# Patient Record
Sex: MALE | Race: BLACK OR AFRICAN AMERICAN | NOT HISPANIC OR LATINO | Employment: FULL TIME | URBAN - METROPOLITAN AREA
[De-identification: names, ages, dates, MRNs, and addresses within clinical notes are randomized per-mention and may not be internally consistent; named-entity substitution may affect disease eponyms.]

---

## 2017-11-27 ENCOUNTER — CLINICAL SUPPORT (OUTPATIENT)
Dept: INTERNAL MEDICINE | Facility: CLINIC | Age: 62
End: 2017-11-27

## 2017-11-27 DIAGNOSIS — Z00.00 PHYSICAL EXAM: Primary | ICD-10-CM

## 2017-11-27 LAB
BILIRUB SERPL-MCNC: NEGATIVE MG/DL
BLOOD URINE, POC: ABNORMAL
COLOR, POC UA: YELLOW
GLUCOSE UR QL STRIP: NEGATIVE
KETONES UR QL STRIP: NEGATIVE
LEUKOCYTE ESTERASE URINE, POC: NEGATIVE
NITRITE, POC UA: NEGATIVE
PH, POC UA: 5
PROTEIN, POC: NEGATIVE
SPECIFIC GRAVITY, POC UA: 1.03
UROBILINOGEN, POC UA: 0.2

## 2017-11-27 PROCEDURE — 99201 PR OFFICE/OUTPT VISIT,NEW,LEVL I: CPT | Mod: ,,, | Performed by: INTERNAL MEDICINE

## 2017-11-27 PROCEDURE — 81002 URINALYSIS NONAUTO W/O SCOPE: CPT | Mod: ,,, | Performed by: INTERNAL MEDICINE

## 2022-08-12 ENCOUNTER — HOSPITAL ENCOUNTER (EMERGENCY)
Facility: HOSPITAL | Age: 67
Discharge: HOME | End: 2022-08-12
Attending: FAMILY MEDICINE
Payer: MEDICARE

## 2022-08-12 VITALS
TEMPERATURE: 98 F | SYSTOLIC BLOOD PRESSURE: 171 MMHG | HEART RATE: 73 BPM | RESPIRATION RATE: 20 BRPM | WEIGHT: 243 LBS | OXYGEN SATURATION: 97 % | DIASTOLIC BLOOD PRESSURE: 88 MMHG | BODY MASS INDEX: 40.44 KG/M2

## 2022-08-12 DIAGNOSIS — J20.8 ACUTE BACTERIAL BRONCHITIS: Primary | ICD-10-CM

## 2022-08-12 DIAGNOSIS — B96.89 ACUTE BACTERIAL BRONCHITIS: Primary | ICD-10-CM

## 2022-08-12 DIAGNOSIS — R05.9 COUGH: ICD-10-CM

## 2022-08-12 LAB — SARS-COV-2 RDRP RESP QL NAA+PROBE: NEGATIVE

## 2022-08-12 PROCEDURE — U0002 COVID-19 LAB TEST NON-CDC: HCPCS | Mod: ER | Performed by: FAMILY MEDICINE

## 2022-08-12 PROCEDURE — 99284 EMERGENCY DEPT VISIT MOD MDM: CPT | Mod: 25,ER

## 2022-08-12 RX ORDER — BENZONATATE 100 MG/1
200 CAPSULE ORAL
Status: DISCONTINUED | OUTPATIENT
Start: 2022-08-12 | End: 2022-08-12

## 2022-08-12 RX ORDER — AZITHROMYCIN 250 MG/1
500 TABLET, FILM COATED ORAL
Status: DISCONTINUED | OUTPATIENT
Start: 2022-08-12 | End: 2022-08-12

## 2022-08-12 RX ORDER — BENZONATATE 200 MG/1
200 CAPSULE ORAL 3 TIMES DAILY PRN
Qty: 30 CAPSULE | Refills: 0 | Status: SHIPPED | OUTPATIENT
Start: 2022-08-12 | End: 2022-08-22

## 2022-08-12 RX ORDER — AZITHROMYCIN 250 MG/1
250 TABLET, FILM COATED ORAL DAILY
Qty: 6 TABLET | Refills: 0 | Status: SHIPPED | OUTPATIENT
Start: 2022-08-12

## 2022-08-12 NOTE — ED PROVIDER NOTES
Encounter Date: 8/12/2022       History     Chief Complaint   Patient presents with    Cough     C/o cough and congestion for 1 week. Also reports diarrhea.      66-year-old male complains of productive cough for last 5 days.  White mucus.  No fever.  No shortness of breath.  No nasal congestion.  Mild sore throat.  Patient also had 1 episode of loose bowel.  No nausea or vomiting.  No abdominal pain.  Denies having fever.    The history is provided by the patient.     Review of patient's allergies indicates:  No Known Allergies  Past Medical History:   Diagnosis Date    Hypertension      History reviewed. No pertinent surgical history.  History reviewed. No pertinent family history.  Social History     Tobacco Use    Smoking status: Never Smoker    Smokeless tobacco: Never Used   Substance Use Topics    Alcohol use: Yes    Drug use: No     Review of Systems   Constitutional: Negative for activity change, appetite change, chills, diaphoresis and fever.   HENT: Positive for congestion and sore throat. Negative for ear discharge, ear pain, postnasal drip and rhinorrhea.    Eyes: Negative for photophobia, pain, redness and visual disturbance.   Respiratory: Positive for cough. Negative for chest tightness, shortness of breath and wheezing.    Cardiovascular: Negative for chest pain, palpitations and leg swelling.   Gastrointestinal: Positive for diarrhea. Negative for abdominal distention, abdominal pain, nausea and vomiting.   Genitourinary: Negative for dysuria and frequency.   Musculoskeletal: Negative for back pain, gait problem, neck pain and neck stiffness.   Skin: Negative for rash.   Neurological: Negative for dizziness, weakness, light-headedness and headaches.   Psychiatric/Behavioral: Negative for confusion, dysphoric mood and hallucinations. The patient is not nervous/anxious.    All other systems reviewed and are negative.      Physical Exam     Initial Vitals [08/12/22 0714]   BP Pulse Resp Temp  SpO2   (!) 171/88 73 20 97.8 °F (36.6 °C) 97 %      MAP       --         Physical Exam    Nursing note and vitals reviewed.  Constitutional: Vital signs are normal. He appears well-developed and well-nourished. He is active. No distress.   HENT:   Head: Normocephalic.   Nose: Nose normal.   Mouth/Throat: Oropharynx is clear and moist and mucous membranes are normal.   Eyes: Conjunctivae, EOM and lids are normal.   Neck: Neck supple.   Normal range of motion.  Cardiovascular: Normal rate, regular rhythm, S1 normal, S2 normal and normal heart sounds.   Pulmonary/Chest: Breath sounds normal. No respiratory distress. He has no wheezes. He has no rhonchi. He has no rales.   Abdominal: Abdomen is soft. Bowel sounds are normal. He exhibits no distension. There is no abdominal tenderness. There is no rebound.   Musculoskeletal:         General: Normal range of motion.      Right upper arm: Normal.      Left upper arm: Normal.      Cervical back: Normal range of motion and neck supple.      Right lower leg: Normal.      Left lower leg: Normal.     Neurological: He is alert and oriented to person, place, and time. He has normal strength. He displays normal reflexes. No cranial nerve deficit. GCS score is 15. GCS eye subscore is 4. GCS verbal subscore is 5. GCS motor subscore is 6.   Skin: Skin is warm. Capillary refill takes less than 2 seconds.   Psychiatric: He has a normal mood and affect. His speech is normal and behavior is normal. Thought content normal. Cognition and memory are normal.         ED Course   Procedures  Labs Reviewed   SARS-COV-2 RNA AMPLIFICATION, QUAL    Narrative:     Is the patient symptomatic?->Yes          Imaging Results          X-Ray Chest PA And Lateral (Final result)  Result time 08/12/22 07:42:54    Final result by SANTOSH Ramírez Sr., MD (08/12/22 07:42:54)                 Impression:      Normal study.      Electronically signed by: Ahmet Ramírez MD  Date:    08/12/2022  Time:    07:42              Narrative:    EXAMINATION:  XR CHEST PA AND LATERAL    CLINICAL HISTORY:  Cough, unspecified    COMPARISON:  None    FINDINGS:  The size and contour of the heart are normal. The lungs are clear. There is no pneumothorax or pleural effusion.                                 Medications   benzonatate capsule 200 mg (has no administration in time range)   azithromycin tablet 500 mg (has no administration in time range)     Medical Decision Making:   Initial Assessment:   Cough for last 4 days.  One episode of diarrhea and sore throat.  Will get x-ray and COVID swab.  Normal oxygenation and respiratory rate on room air.  Differential Diagnosis:   URI, COVID, pneumonia, bronchitis.  Clinical Tests:   Lab Tests: Ordered and Reviewed  Radiological Study: Ordered and Reviewed  ED Management:  X-ray with no acute findings of pneumonia.  COVID is negative.  Considering length of cough will consider bacterial bronchitis with productive sputum.  Will start Zithromax and Tessalon Perles.  His advised to follow-up with his PCP/ED with any worsening symptoms of cough or shortness of breath or fever.                      Clinical Impression:   Final diagnoses:  [R05.9] Cough  [J20.8, B96.89] Acute bacterial bronchitis (Primary)          ED Disposition Condition    Discharge Stable        ED Prescriptions     Medication Sig Dispense Start Date End Date Auth. Provider    azithromycin (Z-DORCAS) 250 MG tablet Take 1 tablet (250 mg total) by mouth once daily. Take first 2 tablets together, then 1 every day until finished. 6 tablet 8/12/2022  Trenton Pham MD    benzonatate (TESSALON) 200 MG capsule Take 1 capsule (200 mg total) by mouth 3 (three) times daily as needed for Cough. 30 capsule 8/12/2022 8/22/2022 Trenton Pham MD        Follow-up Information     Follow up With Specialties Details Why Contact Info    Primarycare physician  In 2 days F/U            Trenton Pham MD  08/12/22 7325

## 2024-05-17 ENCOUNTER — HOSPITAL ENCOUNTER (INPATIENT)
Facility: HOSPITAL | Age: 69
LOS: 2 days | Discharge: HOME | DRG: 309 | End: 2024-05-20
Attending: EMERGENCY MEDICINE | Admitting: INTERNAL MEDICINE
Payer: MEDICARE

## 2024-05-17 DIAGNOSIS — D64.9 NORMOCYTIC ANEMIA: ICD-10-CM

## 2024-05-17 DIAGNOSIS — R00.1 BRADYCARDIA: ICD-10-CM

## 2024-05-17 DIAGNOSIS — N17.9 ACUTE KIDNEY INJURY: Primary | ICD-10-CM

## 2024-05-17 DIAGNOSIS — R74.8 ELEVATED CPK: ICD-10-CM

## 2024-05-17 DIAGNOSIS — R55 SYNCOPE: ICD-10-CM

## 2024-05-17 DIAGNOSIS — I49.1 PAC (PREMATURE ATRIAL CONTRACTION): ICD-10-CM

## 2024-05-17 DIAGNOSIS — I10 PRIMARY HYPERTENSION: ICD-10-CM

## 2024-05-17 DIAGNOSIS — R40.20 LOSS OF CONSCIOUSNESS: ICD-10-CM

## 2024-05-17 DIAGNOSIS — I49.3 PVC'S (PREMATURE VENTRICULAR CONTRACTIONS): ICD-10-CM

## 2024-05-17 LAB
ALBUMIN SERPL BCP-MCNC: 4.9 G/DL (ref 3.5–5.2)
ALP SERPL-CCNC: 74 U/L (ref 38–126)
ALT SERPL W/O P-5'-P-CCNC: 32 U/L (ref 10–44)
ANION GAP SERPL CALC-SCNC: 13 MMOL/L (ref 8–16)
AST SERPL-CCNC: 28 U/L (ref 15–46)
BASOPHILS # BLD AUTO: 0.03 K/UL (ref 0–0.2)
BASOPHILS NFR BLD: 0.3 % (ref 0–1.9)
BILIRUB SERPL-MCNC: 0.7 MG/DL (ref 0.1–1)
CALCIUM SERPL-MCNC: 9.6 MG/DL (ref 8.7–10.5)
CHLORIDE SERPL-SCNC: 101 MMOL/L (ref 95–110)
CK SERPL-CCNC: 178 U/L (ref 55–170)
CO2 SERPL-SCNC: 22 MMOL/L (ref 23–29)
CREAT SERPL-MCNC: 1.67 MG/DL (ref 0.5–1.4)
D DIMER PPP IA.FEU-MCNC: 0.4 MG/L FEU
DIFFERENTIAL METHOD BLD: ABNORMAL
EOSINOPHIL # BLD AUTO: 0.2 K/UL (ref 0–0.5)
EOSINOPHIL NFR BLD: 1.6 % (ref 0–8)
ERYTHROCYTE [DISTWIDTH] IN BLOOD BY AUTOMATED COUNT: 13.9 % (ref 11.5–14.5)
EST. GFR  (NO RACE VARIABLE): 43.8 ML/MIN/1.73 M^2
GLUCOSE SERPL-MCNC: 93 MG/DL (ref 70–110)
HCT VFR BLD AUTO: 44.8 % (ref 40–54)
HGB BLD-MCNC: 14.6 G/DL (ref 14–18)
IMM GRANULOCYTES # BLD AUTO: 0.05 K/UL (ref 0–0.04)
IMM GRANULOCYTES NFR BLD AUTO: 0.5 % (ref 0–0.5)
LYMPHOCYTES # BLD AUTO: 3.3 K/UL (ref 1–4.8)
LYMPHOCYTES NFR BLD: 32.9 % (ref 18–48)
MAGNESIUM SERPL-MCNC: 2.1 MG/DL (ref 1.6–2.6)
MCH RBC QN AUTO: 28.8 PG (ref 27–31)
MCHC RBC AUTO-ENTMCNC: 32.6 G/DL (ref 32–36)
MCV RBC AUTO: 88 FL (ref 82–98)
MONOCYTES # BLD AUTO: 0.8 K/UL (ref 0.3–1)
MONOCYTES NFR BLD: 8.4 % (ref 4–15)
NEUTROPHILS # BLD AUTO: 5.7 K/UL (ref 1.8–7.7)
NEUTROPHILS NFR BLD: 56.3 % (ref 38–73)
NRBC BLD-RTO: 0 /100 WBC
NT-PROBNP SERPL-MCNC: <20 PG/ML (ref 5–900)
PLATELET # BLD AUTO: 286 K/UL (ref 150–450)
PMV BLD AUTO: 10.2 FL (ref 9.2–12.9)
POTASSIUM SERPL-SCNC: 4 MMOL/L (ref 3.5–5.1)
PROT SERPL-MCNC: 7.9 G/DL (ref 6–8.4)
RBC # BLD AUTO: 5.07 M/UL (ref 4.6–6.2)
SODIUM SERPL-SCNC: 136 MMOL/L (ref 136–145)
TROPONIN I SERPL-MCNC: <0.012 NG/ML (ref 0.01–0.03)
TROPONIN I SERPL-MCNC: <0.012 NG/ML (ref 0.01–0.03)
UUN UR-MCNC: 21 MG/DL (ref 2–20)
WBC # BLD AUTO: 10.05 K/UL (ref 3.9–12.7)

## 2024-05-17 PROCEDURE — G0378 HOSPITAL OBSERVATION PER HR: HCPCS | Mod: ER

## 2024-05-17 PROCEDURE — 85379 FIBRIN DEGRADATION QUANT: CPT | Mod: ER | Performed by: EMERGENCY MEDICINE

## 2024-05-17 PROCEDURE — 93005 ELECTROCARDIOGRAM TRACING: CPT | Mod: ER

## 2024-05-17 PROCEDURE — 83880 ASSAY OF NATRIURETIC PEPTIDE: CPT | Mod: ER | Performed by: EMERGENCY MEDICINE

## 2024-05-17 PROCEDURE — 83735 ASSAY OF MAGNESIUM: CPT | Mod: ER | Performed by: EMERGENCY MEDICINE

## 2024-05-17 PROCEDURE — 99285 EMERGENCY DEPT VISIT HI MDM: CPT | Mod: 25,ER

## 2024-05-17 PROCEDURE — 80053 COMPREHEN METABOLIC PANEL: CPT | Mod: ER | Performed by: EMERGENCY MEDICINE

## 2024-05-17 PROCEDURE — 82550 ASSAY OF CK (CPK): CPT | Mod: ER | Performed by: EMERGENCY MEDICINE

## 2024-05-17 PROCEDURE — 84484 ASSAY OF TROPONIN QUANT: CPT | Mod: ER | Performed by: EMERGENCY MEDICINE

## 2024-05-17 PROCEDURE — 85025 COMPLETE CBC W/AUTO DIFF WBC: CPT | Mod: ER | Performed by: EMERGENCY MEDICINE

## 2024-05-17 PROCEDURE — 93010 ELECTROCARDIOGRAM REPORT: CPT | Mod: ,,, | Performed by: INTERNAL MEDICINE

## 2024-05-17 PROCEDURE — 63600175 PHARM REV CODE 636 W HCPCS: Mod: ER | Performed by: EMERGENCY MEDICINE

## 2024-05-17 PROCEDURE — 96361 HYDRATE IV INFUSION ADD-ON: CPT | Mod: ER

## 2024-05-17 RX ORDER — BROMPHENIRAMINE MALEATE, PSEUDOEPHEDRINE HYDROCHLORIDE, AND DEXTROMETHORPHAN HYDROBROMIDE 2; 30; 10 MG/5ML; MG/5ML; MG/5ML
10 SYRUP ORAL EVERY 6 HOURS PRN
Status: ON HOLD | COMMUNITY
Start: 2024-04-03 | End: 2024-05-18

## 2024-05-17 RX ORDER — OMEPRAZOLE 20 MG/1
20 CAPSULE, DELAYED RELEASE ORAL DAILY
COMMUNITY
Start: 2024-04-24

## 2024-05-17 RX ORDER — IRBESARTAN 300 MG/1
300 TABLET ORAL DAILY
COMMUNITY
Start: 2023-12-27

## 2024-05-17 RX ORDER — GABAPENTIN 300 MG/1
300 CAPSULE ORAL NIGHTLY
COMMUNITY

## 2024-05-17 RX ORDER — TRIAMTERENE AND HYDROCHLOROTHIAZIDE 37.5; 25 MG/1; MG/1
1 CAPSULE ORAL EVERY MORNING
Status: ON HOLD | COMMUNITY
End: 2024-05-20 | Stop reason: HOSPADM

## 2024-05-17 RX ORDER — IBUPROFEN 800 MG/1
800 TABLET ORAL EVERY 8 HOURS PRN
Status: ON HOLD | COMMUNITY
Start: 2024-04-17 | End: 2024-05-20 | Stop reason: HOSPADM

## 2024-05-17 RX ORDER — AMLODIPINE BESYLATE 10 MG/1
10 TABLET ORAL DAILY
COMMUNITY

## 2024-05-17 RX ADMIN — SODIUM CHLORIDE, POTASSIUM CHLORIDE, SODIUM LACTATE AND CALCIUM CHLORIDE 1000 ML: 600; 310; 30; 20 INJECTION, SOLUTION INTRAVENOUS at 04:05

## 2024-05-17 NOTE — ED PROVIDER NOTES
Encounter Date: 5/17/2024       History     Chief Complaint   Patient presents with    Loss of Consciousness     Patient was in a boat fishing with sibling when he got dizzy and passed out about 1hr ago.      Randall Majano is a 68 y.o. male who  has a past medical history of Hypertension.    The patient presents to the ED due to loss of consciousness.  Patient was out fishing and warm he had been whether for the past couple of hours.  He reportedly was sitting on a cooler when he felt weak stood up felt dizzy and then lost consciousness.  Patient's brother was present however does not recall how long he was unconscious for.  Patient at this time denies history of previous episodes recent illness recent chest pain or shortness of breath numbness or weakness.  He states  he did not drink any water today in had a piece of toast for breakfast.  He had 1 beer as well.  He has a history of high blood pressure and took his blood pressure medications this morning.    The history is provided by the patient.     Review of patient's allergies indicates:  No Known Allergies  Past Medical History:   Diagnosis Date    Hypertension      History reviewed. No pertinent surgical history.  No family history on file.  Social History     Tobacco Use    Smoking status: Never    Smokeless tobacco: Never   Substance Use Topics    Alcohol use: Yes    Drug use: No     Review of Systems   Constitutional:  Negative for fever.   HENT:  Negative for sore throat.    Respiratory:  Negative for shortness of breath.    Cardiovascular:  Negative for chest pain.   Gastrointestinal:  Negative for nausea.   Genitourinary:  Negative for dysuria.   Musculoskeletal:  Negative for back pain.   Skin:  Negative for rash.   Neurological:  Positive for syncope. Negative for weakness.   Hematological:  Does not bruise/bleed easily.       Physical Exam     Initial Vitals [05/17/24 1600]   BP Pulse Resp Temp SpO2   (!) 110/55 71 16 98.2 °F (36.8 °C) 98 %      MAP        --         Physical Exam    Nursing note and vitals reviewed.  Constitutional: He appears well-developed and well-nourished. He is not diaphoretic. No distress.   HENT:   Head: Normocephalic and atraumatic.   Mouth/Throat: Oropharynx is clear and moist.   Eyes: EOM are normal. Pupils are equal, round, and reactive to light.   Neck: No tracheal deviation present.   Cardiovascular:  Normal rate, regular rhythm, normal heart sounds and intact distal pulses.           Pulmonary/Chest: Breath sounds normal. No stridor. No respiratory distress.   Abdominal: Abdomen is soft. He exhibits no distension and no mass. There is no abdominal tenderness.   Musculoskeletal:         General: No edema. Normal range of motion.     Neurological: He is alert and oriented to person, place, and time. He has normal strength. No cranial nerve deficit or sensory deficit.   Skin: Skin is warm and dry. Capillary refill takes less than 2 seconds. No rash noted.   Psychiatric: He has a normal mood and affect. His behavior is normal.         ED Course   Procedures  Labs Reviewed   CBC W/ AUTO DIFFERENTIAL - Abnormal; Notable for the following components:       Result Value    Immature Grans (Abs) 0.05 (*)     All other components within normal limits   COMPREHENSIVE METABOLIC PANEL - Abnormal; Notable for the following components:    CO2 22 (*)     BUN 21 (*)     Creatinine 1.67 (*)     eGFR 43.8 (*)     All other components within normal limits   CK - Abnormal; Notable for the following components:     (*)     All other components within normal limits   NT-PRO NATRIURETIC PEPTIDE   MAGNESIUM   TROPONIN I   TROPONIN I   D DIMER, QUANTITATIVE          Imaging Results              X-Ray Chest 1 View (Final result)  Result time 05/17/24 17:52:12      Final result by Arnav Shannon MD (05/17/24 17:52:12)                   Impression:        No acute infiltrates.      Finalized on: 5/17/2024 5:52 PM By:  DHIRAJ Shannon MD, MD  [facility]# [id number]       2024-05-17 17:54:22.401    [facility]               Narrative:    EXAM:   XR CHEST 1 VIEW    CLINICAL HISTORY: Syncope    COMPARISON: None.    FINDINGS:  The lungs are clear.   No pneumothorax.  Mild cardiomegaly.                                             Medications   sodium chloride 0.9% flush 10 mL (has no administration in time range)   naloxone 0.4 mg/mL injection 0.02 mg (has no administration in time range)   glucose chewable tablet 16 g (has no administration in time range)   glucose chewable tablet 24 g (has no administration in time range)   glucagon (human recombinant) injection 1 mg (has no administration in time range)   enoxaparin injection 40 mg (has no administration in time range)   dextrose 10% bolus 125 mL 125 mL (has no administration in time range)   dextrose 10% bolus 250 mL 250 mL (has no administration in time range)   ondansetron injection 4 mg (4 mg Intravenous Given 5/18/24 0129)   amLODIPine tablet 10 mg (has no administration in time range)   gabapentin capsule 300 mg (300 mg Oral Given 5/18/24 0216)   lactated ringers bolus 1,000 mL (1,000 mLs Intravenous New Bag 5/18/24 1633)   atorvastatin tablet 40 mg (has no administration in time range)   lactated ringers bolus 1,000 mL (0 mLs Intravenous Stopped 5/17/24 6904)     Medical Decision Making  Differential Diagnosis includes, but is not limited to:  Arrhythmia, aortic dissection, MI/unstable angina, PE, cardiogenic shock, CHF, CVA/TIA, intracranial lesion/mass, seizure, perforated viscous, ruptured AAA, orthostatic hypotension, vasovagal episode, anemia, dehydration, medication reaction, intentional overdose      Amount and/or Complexity of Data Reviewed  Labs: ordered. Decision-making details documented in ED Course.  Radiology: ordered.  Discussion of management or test interpretation with external provider(s): Discussed with Dr. Cope. Will place on tele, will need echocardiogram, troponin trending. The cardiology team will  evaluate him in the AM.                ED Course as of 05/18/24 0617   Fri May 17, 2024   1619   EKG: Rate 81.  Sinus rhythm PACs and PVCs.  Nonspecific ST changes.  No STEMI. [RN]   1800 CBC auto differential(!) [RN]   1800 NT-Pro Natriuretic Peptide [RN]   1800 Magnesium [RN]   1800 Troponin I [RN]   1800 Comprehensive metabolic panel(!) [RN]   1804   Patient will be admitted for telemetry and further evaluation of syncope.  D-dimer and repeat troponin pending.   Patient is in agreement with this plan. [RN]      ED Course User Index  [RN] Mikel Sanchez Jr., MD                             Clinical Impression:  Final diagnoses:  [R40.20] Loss of consciousness  [R55] Syncope       Portions of this note were dictated using voice recognition software and may contain dictation related errors in spelling/grammar/syntax not found on text review     ED Disposition Condition    Observation Stable                Mikel Sanchez Jr., MD  05/17/24 1801       Mikel Sanchez Jr., MD  05/18/24 0617     3-4 yrs

## 2024-05-17 NOTE — PHARMACY MED REC
"  [facility]           Pharmacy  Admission Medication History     The home medication history was taken by Divya Braden.      Medication history obtained from Medications listed below were obtained from: Patient/family    Based on information gathered for medication list, you may go to "Admission" then "Reconcile Home Medications" tabs to review and/or act upon those items.     The home medication list has been updated by the Pharmacy department.   Please read ALL comments highlighted in yellow.   Please address this information as you see fit.    Feel free to contact us if you have any questions or require assistance.    The medications listed below were removed from the home medication list.  Please reorder if appropriate:    Patient reports NOT TAKING the following medication(s):  Zpak 250mg    No current facility-administered medications on file prior to encounter.     Current Outpatient Medications on File Prior to Encounter   Medication Sig Dispense Refill    amLODIPine (NORVASC) 10 MG tablet Take 10 mg by mouth once daily.      gabapentin (NEURONTIN) 300 MG capsule Take 300 mg by mouth every evening.      ibuprofen (ADVIL,MOTRIN) 800 MG tablet Take 800 mg by mouth every 8 (eight) hours as needed.      irbesartan (AVAPRO) 300 MG tablet Take 300 mg by mouth once daily.      latanoprost 0.005 % ophthalmic solution Place 1 drop into both eyes every evening.      omeprazole (PRILOSEC) 20 MG capsule Take 20 mg by mouth once daily.      triamterene-hydrochlorothiazide 37.5-25 mg (DYAZIDE) 37.5-25 mg per capsule Take 1 capsule by mouth every morning.         Please address this information as you see fit.  Feel free to contact us if you have any questions or require assistance.    Divay Braden  [phone]                .          "

## 2024-05-18 PROBLEM — R74.8 ELEVATED CPK: Status: ACTIVE | Noted: 2024-05-18

## 2024-05-18 PROBLEM — R40.20 LOSS OF CONSCIOUSNESS: Status: ACTIVE | Noted: 2024-05-18

## 2024-05-18 PROBLEM — R55 SYNCOPE: Status: ACTIVE | Noted: 2024-05-18

## 2024-05-18 PROBLEM — I49.3 PVC'S (PREMATURE VENTRICULAR CONTRACTIONS): Status: ACTIVE | Noted: 2024-05-18

## 2024-05-18 PROBLEM — I49.1 PAC (PREMATURE ATRIAL CONTRACTION): Status: ACTIVE | Noted: 2024-05-18

## 2024-05-18 PROBLEM — N17.9 ACUTE KIDNEY INJURY: Status: ACTIVE | Noted: 2024-05-18

## 2024-05-18 PROBLEM — R00.1 BRADYCARDIA: Status: ACTIVE | Noted: 2024-05-18

## 2024-05-18 PROBLEM — I10 PRIMARY HYPERTENSION: Status: ACTIVE | Noted: 2024-05-18

## 2024-05-18 LAB
ALBUMIN SERPL BCP-MCNC: 3.8 G/DL (ref 3.5–5.2)
ALP SERPL-CCNC: 70 U/L (ref 55–135)
ALT SERPL W/O P-5'-P-CCNC: 24 U/L (ref 10–44)
ANION GAP SERPL CALC-SCNC: 14 MMOL/L (ref 8–16)
AST SERPL-CCNC: 18 U/L (ref 10–40)
BASOPHILS # BLD AUTO: 0.03 K/UL (ref 0–0.2)
BASOPHILS NFR BLD: 0.3 % (ref 0–1.9)
BILIRUB SERPL-MCNC: 0.5 MG/DL (ref 0.1–1)
BILIRUB UR QL STRIP: NEGATIVE
BUN SERPL-MCNC: 17 MG/DL (ref 8–23)
CALCIUM SERPL-MCNC: 9.9 MG/DL (ref 8.7–10.5)
CHLORIDE SERPL-SCNC: 101 MMOL/L (ref 95–110)
CHOLEST SERPL-MCNC: 144 MG/DL (ref 120–199)
CHOLEST/HDLC SERPL: 3.1 {RATIO} (ref 2–5)
CLARITY UR: CLEAR
CO2 SERPL-SCNC: 22 MMOL/L (ref 23–29)
COLOR UR: NORMAL
CREAT SERPL-MCNC: 1.3 MG/DL (ref 0.5–1.4)
CREAT UR-MCNC: 73.7 MG/DL (ref 23–375)
DIFFERENTIAL METHOD BLD: ABNORMAL
EOSINOPHIL # BLD AUTO: 0.2 K/UL (ref 0–0.5)
EOSINOPHIL NFR BLD: 1.4 % (ref 0–8)
ERYTHROCYTE [DISTWIDTH] IN BLOOD BY AUTOMATED COUNT: 14.1 % (ref 11.5–14.5)
EST. GFR  (NO RACE VARIABLE): 59 ML/MIN/1.73 M^2
ESTIMATED AVG GLUCOSE: 111 MG/DL (ref 68–131)
FERRITIN SERPL-MCNC: 452 NG/ML (ref 20–300)
FOLATE SERPL-MCNC: 15.4 NG/ML (ref 4–24)
GLUCOSE SERPL-MCNC: 100 MG/DL (ref 70–110)
GLUCOSE UR QL STRIP: NEGATIVE
HBA1C MFR BLD: 5.5 % (ref 4–5.6)
HCT VFR BLD AUTO: 40.4 % (ref 40–54)
HDLC SERPL-MCNC: 46 MG/DL (ref 40–75)
HDLC SERPL: 31.9 % (ref 20–50)
HGB BLD-MCNC: 13.5 G/DL (ref 14–18)
HGB UR QL STRIP: NEGATIVE
IMM GRANULOCYTES # BLD AUTO: 0.04 K/UL (ref 0–0.04)
IMM GRANULOCYTES NFR BLD AUTO: 0.3 % (ref 0–0.5)
IRON SERPL-MCNC: 41 UG/DL (ref 45–160)
KETONES UR QL STRIP: NEGATIVE
LDLC SERPL CALC-MCNC: 79 MG/DL (ref 63–159)
LEUKOCYTE ESTERASE UR QL STRIP: NEGATIVE
LYMPHOCYTES # BLD AUTO: 3.9 K/UL (ref 1–4.8)
LYMPHOCYTES NFR BLD: 33.3 % (ref 18–48)
MAGNESIUM SERPL-MCNC: 2 MG/DL (ref 1.6–2.6)
MCH RBC QN AUTO: 28.8 PG (ref 27–31)
MCHC RBC AUTO-ENTMCNC: 33.4 G/DL (ref 32–36)
MCV RBC AUTO: 86 FL (ref 82–98)
MONOCYTES # BLD AUTO: 0.8 K/UL (ref 0.3–1)
MONOCYTES NFR BLD: 7.1 % (ref 4–15)
NEUTROPHILS # BLD AUTO: 6.8 K/UL (ref 1.8–7.7)
NEUTROPHILS NFR BLD: 57.6 % (ref 38–73)
NITRITE UR QL STRIP: NEGATIVE
NONHDLC SERPL-MCNC: 98 MG/DL
NRBC BLD-RTO: 0 /100 WBC
PH UR STRIP: 6 [PH] (ref 5–8)
PHOSPHATE SERPL-MCNC: 4.4 MG/DL (ref 2.7–4.5)
PLATELET # BLD AUTO: 277 K/UL (ref 150–450)
PMV BLD AUTO: 10.3 FL (ref 9.2–12.9)
POTASSIUM SERPL-SCNC: 4.2 MMOL/L (ref 3.5–5.1)
PROT SERPL-MCNC: 7.3 G/DL (ref 6–8.4)
PROT UR QL STRIP: NEGATIVE
RBC # BLD AUTO: 4.68 M/UL (ref 4.6–6.2)
SATURATED IRON: 12 % (ref 20–50)
SODIUM SERPL-SCNC: 137 MMOL/L (ref 136–145)
SODIUM UR-SCNC: 127 MMOL/L (ref 20–250)
SP GR UR STRIP: 1.01 (ref 1–1.03)
TOTAL IRON BINDING CAPACITY: 342 UG/DL (ref 250–450)
TRANSFERRIN SERPL-MCNC: 231 MG/DL (ref 200–375)
TRIGL SERPL-MCNC: 95 MG/DL (ref 30–150)
TROPONIN I SERPL DL<=0.01 NG/ML-MCNC: <0.006 NG/ML (ref 0–0.03)
TSH SERPL DL<=0.005 MIU/L-ACNC: 1.67 UIU/ML (ref 0.4–4)
URN SPEC COLLECT METH UR: NORMAL
UROBILINOGEN UR STRIP-ACNC: NEGATIVE EU/DL
VIT B12 SERPL-MCNC: 1062 PG/ML (ref 210–950)
WBC # BLD AUTO: 11.77 K/UL (ref 3.9–12.7)

## 2024-05-18 PROCEDURE — 80061 LIPID PANEL: CPT | Performed by: STUDENT IN AN ORGANIZED HEALTH CARE EDUCATION/TRAINING PROGRAM

## 2024-05-18 PROCEDURE — 82746 ASSAY OF FOLIC ACID SERUM: CPT

## 2024-05-18 PROCEDURE — 96361 HYDRATE IV INFUSION ADD-ON: CPT

## 2024-05-18 PROCEDURE — 36415 COLL VENOUS BLD VENIPUNCTURE: CPT

## 2024-05-18 PROCEDURE — 80053 COMPREHEN METABOLIC PANEL: CPT | Performed by: STUDENT IN AN ORGANIZED HEALTH CARE EDUCATION/TRAINING PROGRAM

## 2024-05-18 PROCEDURE — 84300 ASSAY OF URINE SODIUM: CPT | Performed by: STUDENT IN AN ORGANIZED HEALTH CARE EDUCATION/TRAINING PROGRAM

## 2024-05-18 PROCEDURE — 36415 COLL VENOUS BLD VENIPUNCTURE: CPT | Performed by: STUDENT IN AN ORGANIZED HEALTH CARE EDUCATION/TRAINING PROGRAM

## 2024-05-18 PROCEDURE — 63600175 PHARM REV CODE 636 W HCPCS: Performed by: STUDENT IN AN ORGANIZED HEALTH CARE EDUCATION/TRAINING PROGRAM

## 2024-05-18 PROCEDURE — 96374 THER/PROPH/DIAG INJ IV PUSH: CPT

## 2024-05-18 PROCEDURE — 99900035 HC TECH TIME PER 15 MIN (STAT)

## 2024-05-18 PROCEDURE — 82728 ASSAY OF FERRITIN: CPT

## 2024-05-18 PROCEDURE — 84100 ASSAY OF PHOSPHORUS: CPT | Performed by: STUDENT IN AN ORGANIZED HEALTH CARE EDUCATION/TRAINING PROGRAM

## 2024-05-18 PROCEDURE — 25000003 PHARM REV CODE 250: Performed by: STUDENT IN AN ORGANIZED HEALTH CARE EDUCATION/TRAINING PROGRAM

## 2024-05-18 PROCEDURE — 83036 HEMOGLOBIN GLYCOSYLATED A1C: CPT | Performed by: STUDENT IN AN ORGANIZED HEALTH CARE EDUCATION/TRAINING PROGRAM

## 2024-05-18 PROCEDURE — 83735 ASSAY OF MAGNESIUM: CPT | Performed by: STUDENT IN AN ORGANIZED HEALTH CARE EDUCATION/TRAINING PROGRAM

## 2024-05-18 PROCEDURE — 82607 VITAMIN B-12: CPT

## 2024-05-18 PROCEDURE — 81003 URINALYSIS AUTO W/O SCOPE: CPT | Performed by: STUDENT IN AN ORGANIZED HEALTH CARE EDUCATION/TRAINING PROGRAM

## 2024-05-18 PROCEDURE — 84484 ASSAY OF TROPONIN QUANT: CPT | Performed by: STUDENT IN AN ORGANIZED HEALTH CARE EDUCATION/TRAINING PROGRAM

## 2024-05-18 PROCEDURE — 84443 ASSAY THYROID STIM HORMONE: CPT | Performed by: STUDENT IN AN ORGANIZED HEALTH CARE EDUCATION/TRAINING PROGRAM

## 2024-05-18 PROCEDURE — 99223 1ST HOSP IP/OBS HIGH 75: CPT | Mod: 25,,, | Performed by: STUDENT IN AN ORGANIZED HEALTH CARE EDUCATION/TRAINING PROGRAM

## 2024-05-18 PROCEDURE — 85025 COMPLETE CBC W/AUTO DIFF WBC: CPT | Performed by: STUDENT IN AN ORGANIZED HEALTH CARE EDUCATION/TRAINING PROGRAM

## 2024-05-18 PROCEDURE — 11000001 HC ACUTE MED/SURG PRIVATE ROOM

## 2024-05-18 PROCEDURE — 82570 ASSAY OF URINE CREATININE: CPT | Performed by: STUDENT IN AN ORGANIZED HEALTH CARE EDUCATION/TRAINING PROGRAM

## 2024-05-18 PROCEDURE — 83540 ASSAY OF IRON: CPT

## 2024-05-18 PROCEDURE — 93005 ELECTROCARDIOGRAM TRACING: CPT | Mod: ER

## 2024-05-18 PROCEDURE — 93010 ELECTROCARDIOGRAM REPORT: CPT | Mod: 76,,, | Performed by: INTERNAL MEDICINE

## 2024-05-18 RX ORDER — GLUCAGON 1 MG
1 KIT INJECTION
Status: DISCONTINUED | OUTPATIENT
Start: 2024-05-18 | End: 2024-05-20 | Stop reason: HOSPADM

## 2024-05-18 RX ORDER — SODIUM CHLORIDE 0.9 % (FLUSH) 0.9 %
10 SYRINGE (ML) INJECTION EVERY 12 HOURS PRN
Status: DISCONTINUED | OUTPATIENT
Start: 2024-05-18 | End: 2024-05-20 | Stop reason: HOSPADM

## 2024-05-18 RX ORDER — GABAPENTIN 300 MG/1
300 CAPSULE ORAL NIGHTLY
Status: DISCONTINUED | OUTPATIENT
Start: 2024-05-18 | End: 2024-05-20 | Stop reason: HOSPADM

## 2024-05-18 RX ORDER — ENOXAPARIN SODIUM 100 MG/ML
40 INJECTION SUBCUTANEOUS EVERY 24 HOURS
Status: DISCONTINUED | OUTPATIENT
Start: 2024-05-18 | End: 2024-05-20 | Stop reason: HOSPADM

## 2024-05-18 RX ORDER — NALOXONE HCL 0.4 MG/ML
0.02 VIAL (ML) INJECTION
Status: DISCONTINUED | OUTPATIENT
Start: 2024-05-18 | End: 2024-05-20 | Stop reason: HOSPADM

## 2024-05-18 RX ORDER — IBUPROFEN 200 MG
16 TABLET ORAL
Status: DISCONTINUED | OUTPATIENT
Start: 2024-05-18 | End: 2024-05-20 | Stop reason: HOSPADM

## 2024-05-18 RX ORDER — LOSARTAN POTASSIUM 50 MG/1
100 TABLET ORAL DAILY
Status: DISCONTINUED | OUTPATIENT
Start: 2024-05-18 | End: 2024-05-18

## 2024-05-18 RX ORDER — IBUPROFEN 200 MG
24 TABLET ORAL
Status: DISCONTINUED | OUTPATIENT
Start: 2024-05-18 | End: 2024-05-20 | Stop reason: HOSPADM

## 2024-05-18 RX ORDER — ONDANSETRON HYDROCHLORIDE 2 MG/ML
4 INJECTION, SOLUTION INTRAVENOUS EVERY 6 HOURS PRN
Status: DISCONTINUED | OUTPATIENT
Start: 2024-05-18 | End: 2024-05-20 | Stop reason: HOSPADM

## 2024-05-18 RX ORDER — ATORVASTATIN CALCIUM 40 MG/1
40 TABLET, FILM COATED ORAL DAILY
Status: DISCONTINUED | OUTPATIENT
Start: 2024-05-18 | End: 2024-05-20 | Stop reason: HOSPADM

## 2024-05-18 RX ORDER — AMLODIPINE BESYLATE 5 MG/1
10 TABLET ORAL DAILY
Status: DISCONTINUED | OUTPATIENT
Start: 2024-05-18 | End: 2024-05-20 | Stop reason: HOSPADM

## 2024-05-18 RX ADMIN — SODIUM CHLORIDE, POTASSIUM CHLORIDE, SODIUM LACTATE AND CALCIUM CHLORIDE 1000 ML: 600; 310; 30; 20 INJECTION, SOLUTION INTRAVENOUS at 05:05

## 2024-05-18 RX ADMIN — ONDANSETRON 4 MG: 2 INJECTION INTRAMUSCULAR; INTRAVENOUS at 01:05

## 2024-05-18 RX ADMIN — GABAPENTIN 300 MG: 300 CAPSULE ORAL at 02:05

## 2024-05-18 RX ADMIN — GABAPENTIN 300 MG: 300 CAPSULE ORAL at 08:05

## 2024-05-18 RX ADMIN — ENOXAPARIN SODIUM 40 MG: 40 INJECTION SUBCUTANEOUS at 05:05

## 2024-05-18 RX ADMIN — ATORVASTATIN CALCIUM 40 MG: 40 TABLET, FILM COATED ORAL at 10:05

## 2024-05-18 NOTE — CONSULTS
Cardiology    Reason for Consult: Syncope    SUBJECTIVE:     History of Present Illness:  Patient is a 68 y.o. male presents with with pmhx of HTN presented to ED due to syncopal episode. He reportedly was sitting on a cooler when he felt weak stood up felt dizzy and then lost consciousness.  Patient's brother was present however does not recall how long he was unconscious for. No prodromal symptoms. No previous syncopal episodes. Denies cp, sob, palpitations, orthopnea, PND, bendopnea, decrease ET, NVDC, fever, chills.    Followed at [facility] for bradycardia by Naif Obregon 05/02/22    Review of patient's allergies indicates:  No Known Allergies    Past Medical History:   Diagnosis Date    Hypertension      History reviewed. No pertinent surgical history.  No family history on file.  Social History     Tobacco Use    Smoking status: Never    Smokeless tobacco: Never   Substance Use Topics    Alcohol use: Yes    Drug use: No        Home meds:  No current facility-administered medications on file prior to encounter.     Current Outpatient Medications on File Prior to Encounter   Medication Sig Dispense Refill    amLODIPine (NORVASC) 10 MG tablet Take 10 mg by mouth once daily.      gabapentin (NEURONTIN) 300 MG capsule Take 300 mg by mouth every evening.      ibuprofen (ADVIL,MOTRIN) 800 MG tablet Take 800 mg by mouth every 8 (eight) hours as needed.      irbesartan (AVAPRO) 300 MG tablet Take 300 mg by mouth once daily.      omeprazole (PRILOSEC) 20 MG capsule Take 20 mg by mouth once daily.      triamterene-hydrochlorothiazide 37.5-25 mg (DYAZIDE) 37.5-25 mg per capsule Take 1 capsule by mouth every morning.      [DISCONTINUED] latanoprost 0.005 % ophthalmic solution Place 1 drop into both eyes every evening.      [DISCONTINUED] brompheniramine-pseudoeph-DM (BROMFED DM) 2-30-10 mg/5 mL Syrp Take 10 mLs by mouth every 6 (six) hours as needed.         Current meds:  Scheduled Meds:   amLODIPine  10 mg Oral Daily     atorvastatin  40 mg Oral Daily    enoxparin  40 mg Subcutaneous Daily    gabapentin  300 mg Oral QHS     Continuous Infusions:  PRN Meds:.  Current Facility-Administered Medications:     dextrose 10%, 12.5 g, Intravenous, PRN    dextrose 10%, 25 g, Intravenous, PRN    glucagon (human recombinant), 1 mg, Intramuscular, PRN    glucose, 16 g, Oral, PRN    glucose, 24 g, Oral, PRN    naloxone, 0.02 mg, Intravenous, PRN    ondansetron, 4 mg, Intravenous, Q6H PRN    sodium chloride 0.9%, 10 mL, Intravenous, Q12H PRN      OBJECTIVE:     Vital Signs (Most Recent)  Temp: 97.9 °F (36.6 °C) (05/18/24 0807)  Pulse: (!) 46 (05/18/24 0956)  Resp: 18 (05/18/24 0952)  BP: 121/63 (05/18/24 0956)  SpO2: 98 % (05/18/24 0956)    Vital Signs Range (Last 24H):  Temp:  [97.9 °F (36.6 °C)-98.2 °F (36.8 °C)]   Pulse:  [39-88]   Resp:  [14-28]   BP: (110-168)/(55-94)   SpO2:  [94 %-98 %]     Physical Exam:  Gen: AOx3, NAD, comfortable appearing  HEENT: NCAT, PERRL, EOMI, MMM, JVP, no thyromegaly, lymphadenopathy appreciated  CV: RRR, S1/S2 appreciated, no murmur; no gallop or rubs  Resp: CTAB, no increased WOB  Abdomen: Soft, NT, ND, +BS  Ext: 2+ distal pulses, no edema appreciated  Skin: Warm, dry, no rashes appreciated  Psych: Good mood, Affect  Neuro: AAOx4, Strength 5/5 in extremities bilaterally; sensation to touch equal throughout, follows commands        Laboratory:  LABS  CBC  Recent Labs   Lab 05/17/24  1649 05/18/24  0242   WBC 10.05 11.77   RBC 5.07 4.68   HGB 14.6 13.5*   HCT 44.8 40.4    277   MCV 88 86   MCH 28.8 28.8   MCHC 32.6 33.4     BMP  Recent Labs   Lab 05/17/24  1649 05/18/24  0233    137   K 4.0 4.2   CO2 22* 22*    101   BUN 21* 17   CREATININE 1.67* 1.3   GLU 93 100       Recent Labs   Lab 05/17/24  1649 05/18/24  0233   CALCIUM 9.6 9.9   MG 2.1 2.0   PHOS  --  4.4       LFT  Recent Labs   Lab 05/17/24  1649 05/18/24  0233   PROT 7.9 7.3   ALBUMIN 4.9 3.8   BILITOT 0.7 0.5   AST 28 18   ALKPHOS  "74 70   ALT 32 24       COAGS  No results for input(s): "PT", "INR", "APTT" in the last 168 hours.  CE  Recent Labs   Lab 05/17/24  1649 05/17/24  1825 05/18/24  0233   TROPONINI <0.012 <0.012 <0.006     BNP  No results for input(s): "BNP" in the last 168 hours.  Lipid panel:  Lab Results   Component Value Date    CHOL 144 05/18/2024     Lab Results   Component Value Date    HDL 46 05/18/2024     Lab Results   Component Value Date    LDLCALC 79.0 05/18/2024     Lab Results   Component Value Date    TRIG 95 05/18/2024     Lab Results   Component Value Date    CHOLHDL 31.9 05/18/2024     Diagnostic Results:  EKG: NSR with PACs and PVCs  CXR: cardiomegaly      Chart review:  Echo: pending  Carotid US 02/15/24  Peak systolic velocities are as follows:     Right ICA:  68 cm per second,   Right CCA:  111 cm per second,   Right ICA/CCA ratio: 0.6,     Left ICA: 1 7 cm per second,   Left CCA:  107 cm per second,   Left ICA/CCA ratio: 1.0,   STENOSIS MEASUREMENTS ARE DERIVED FROM VELOCITY DATA USING NASCET METHODOLOGY.     IMPRESSION:   No hemodynamically significant stenosis of either carotid bifurcation. Antegrade flow vertebral arteries.     US AAA screening  FINDINGS:     Aorta:   Proximal aorta: 2.5 x 2.4 cm.   Mid aorta: 1.8 x 1.8 cm.   Distal aorta: 1.6 x 1.5 cm.   Right common iliac artery: 1.2 cm.   Left common iliac artery: 1.1 cm.     Shadowing atherosclerotic plaque.     IMPRESSION:   1.   Nonaneurysmal abdominal aorta.       Echo 05/02/22    Interpretation Summary  Ejection Fraction = 55-60%.  There is no definite diastolic dysfunction.  Focally calcified mitral eaflets.  There is trace mitral regurgitation.     ASSESSMENT/PLAN:   69 yo gentleman with pmhx of HTN and bradycardia per chart review presented with syncopal episodes.   Followed at [facility] for bradycardia by Naif Obregon 05/02/22    Syncope- Unclear etiology, ECG with significant PACs/PVCs. Trops/d-dimer negative. Will monitor tele and order echo. " If negative will need an event monitor and follow up.       William Cope MD

## 2024-05-18 NOTE — ED NOTES
Report given to Unit # [id number]  Care assumed  Pt stable for transport with PIV intact with NRTI noted to site

## 2024-05-18 NOTE — ED NOTES
Care assumed.   Pt aaox3, rr even unlabored, remains on continuous pulse ox and monitor, vss. Pt aware of pan of care pending transport

## 2024-05-18 NOTE — ED NOTES
Pt provided with sandwich tray   As per order , regular diet with NPO to began at midnight. Pt educated on orders and to remain NPO after midnight and/or until further notice from admit team/admit floor

## 2024-05-18 NOTE — H&P
"[facility] Internal Medicine History and Physical - Resident Note    Admitting Team: A  Attending Physician: Marcelle  Resident: Buddy    Date of Admit: 5/17/2024    Chief Complaint     Syncopal event yesterday    Subjective:      History of Present Illness:  Randall Majano is a 68 y.o. male with a past medical history of hypertension and diabetes. He presented to [facility] on 5/17/2024 as an admission from [facility] ED with a primary complaint of syncope. He reports he was in his usual state of health until yesterday morning when he awoke feeling "tired." He ate a light breakfast with some coffee and toast. He had plans to go fishing with his brother, and because he was feeling some generalized weakness he asked his wife to drop him off. When he arrived at the [place] with his brother he noticed that the daylight seemed to have dimmed. He stood up and walked over to sit on a log, became nauseated, and then lost consiousness. He reports his brother then walked over and sat him up on the ground, and he regained consciousness. His brother called his wife who then picked him up and brought him to the emergency department. He reports no prior episodes of this happening. He does note that two years ago he was found to have a slow heartbeat at a routine medical appointment with his primary care, was worked up by a cardiologist but does not recall what recommendations he was given at the time. Denies chest pain, shortness of breath, vomiting, abdominal pain.     Past Medical History:  Hypertension  Diabetes       Past Surgical History:  History reviewed. No pertinent surgical history.    Allergies:  Review of patient's allergies indicates:  No Known Allergies    Home Medications:  Prior to Admission medications    Medication Sig Start Date End Date Taking? Authorizing Provider   amLODIPine (NORVASC) 10 MG tablet Take 10 mg by mouth once daily.   Yes Provider, Historical   gabapentin (NEURONTIN) 300 MG " capsule Take 300 mg by mouth every evening.   Yes Provider, Historical   ibuprofen (ADVIL,MOTRIN) 800 MG tablet Take 800 mg by mouth every 8 (eight) hours as needed. 24  Yes Provider, Historical   irbesartan (AVAPRO) 300 MG tablet Take 300 mg by mouth once daily. 23  Yes Provider, Historical   omeprazole (PRILOSEC) 20 MG capsule Take 20 mg by mouth once daily. 24  Yes Provider, Historical   triamterene-hydrochlorothiazide 37.5-25 mg (DYAZIDE) 37.5-25 mg per capsule Take 1 capsule by mouth every morning.   Yes Provider, Historical   latanoprost 0.005 % ophthalmic solution Place 1 drop into both eyes every evening.  24 Yes Provider, Historical   brompheniramine-pseudoeph-DM (BROMFED DM) 2-30-10 mg/5 mL Syrp Take 10 mLs by mouth every 6 (six) hours as needed. 4/3/24 5/18/24  Provider, Historical       Social History:  Social History     Tobacco Use    Smoking status: 1 PPD from age [age]    Smokeless tobacco: Never   Substance Use Topics    Alcohol use: 5-6 beers nightly     Drug use: No   Occupation: Retired, previously worked as a   Education: 9th grade, obtained GED  Housing: Lives with wife, has close family     Review of Systems:  All other systems are reviewed and are negative.    Health Maintaince :   Primary Care Physician:   Immunizations:   COVID-19(MODERNA),MRNA, LNP-S,PF, 100 MCG/0.5 ML DOSE 02/10/2021, 03/10/2021, 2021   Influenza, Seasonal, Injectable 2014   Influenza, Unspecified 2014   Pneumococcal Conjugate PCV 13 2019   Pneumococcal Polysaccharide PPSV 23 2020   TD (Adult) 2015   Varicella 2017    TDap is up to date.  Influenza is not up to date.  Pneumovax is up to date.  Cancer Screening:  Colonoscopy: is up to date. Needs repeat this year.   Low dose screening CT scan: Never done.       Objective:   Last 24 Hour Vital Signs:  BP  Min: 110/55  Max: 168/90  Temp  Av.2 °F (36.8 °C)  Min: 98.2 °F (36.8 °C)  Max: 98.2  "°F (36.8 °C)  Pulse  Av.5  Min: 41  Max: 88  Resp  Av.7  Min: 14  Max: 28  SpO2  Av %  Min: 94 %  Max: 98 %  Height  Av' 7" (170.2 cm)  Min: 5' 7" (170.2 cm)  Max: 5' 7" (170.2 cm)  Weight  Av.9 kg (235 lb 10.7 oz)  Min: 106.6 kg (235 lb)  Max: 107.2 kg (236 lb 5.3 oz)  Body mass index is 37.02 kg/m².  I/O last 3 completed shifts:  In: 1000 [IV Piggyback:1000]  Out: -     Physical Examination:  Physical Exam  Vitals and nursing note reviewed. Exam conducted with a chaperone present.   Constitutional:       General: He is not in acute distress.     Appearance: Normal appearance. He is not ill-appearing.   HENT:      Head: Atraumatic.      Nose: Nose normal. No congestion or rhinorrhea.      Mouth/Throat:      Mouth: Mucous membranes are moist.      Pharynx: Oropharynx is clear.   Eyes:      Extraocular Movements: Extraocular movements intact.      Conjunctiva/sclera: Conjunctivae normal.      Pupils: Pupils are equal, round, and reactive to light.   Cardiovascular:      Rate and Rhythm: Bradycardia present. Rhythm irregular.      Pulses: Normal pulses.   Pulmonary:      Effort: Pulmonary effort is normal. No respiratory distress.      Breath sounds: Normal breath sounds.   Abdominal:      General: Abdomen is flat.      Palpations: Abdomen is soft.   Musculoskeletal:         General: No swelling, tenderness, deformity or signs of injury. Normal range of motion.      Cervical back: Normal range of motion.      Right lower leg: No edema.      Left lower leg: No edema.   Skin:     General: Skin is warm and dry.      Capillary Refill: Capillary refill takes less than 2 seconds.      Coloration: Skin is not jaundiced or pale.   Neurological:      Mental Status: He is alert and oriented to person, place, and time.      Cranial Nerves: No cranial nerve deficit.      Sensory: No sensory deficit.   Psychiatric:         Mood and Affect: Mood normal.         Behavior: Behavior normal.         Thought " "Content: Thought content normal.         Judgment: Judgment normal.           Laboratory:  Most Recent Data:  CBC:   Lab Results   Component Value Date    WBC 10.05 05/17/2024    HGB 14.6 05/17/2024    HCT 44.8 05/17/2024     05/17/2024    MCV 88 05/17/2024    RDW 13.9 05/17/2024     BMP:   Lab Results   Component Value Date     05/17/2024    K 4.0 05/17/2024     05/17/2024    CO2 22 (L) 05/17/2024    BUN 21 (H) 05/17/2024    CREATININE 1.67 (H) 05/17/2024    GLU 93 05/17/2024    CALCIUM 9.6 05/17/2024    MG 2.1 05/17/2024     LFTs:   Lab Results   Component Value Date    PROT 7.9 05/17/2024    ALBUMIN 4.9 05/17/2024    BILITOT 0.7 05/17/2024    AST 28 05/17/2024    ALKPHOS 74 05/17/2024    ALT 32 05/17/2024     Coags:   Lab Results   Component Value Date    INR 1.0 03/29/2023     FLP: No results found for: "CHOL", "HDL", "LDLCALC", "TRIG", "CHOLHDL"  DM:   Lab Results   Component Value Date    CREATININE 1.67 (H) 05/17/2024     Thyroid: No results found for: "TSH", "FREET4", "K8EKSLU", "K6COMFT", "THYROIDAB"  Anemia: No results found for: "IRON", "TIBC", "FERRITIN", "PUTITNED09", "FOLATE"  Cardiac:   Lab Results   Component Value Date    TROPONINI <0.012 05/17/2024     Urinalysis:   Lab Results   Component Value Date    COLORU yellow 11/27/2017    SPECGRAV 1.030 11/27/2017    NITRITE negative 11/27/2017    KETONESU negative 11/27/2017    UROBILINOGEN 0.2 11/27/2017       Trended Lab Data:  Recent Labs   Lab 05/17/24  1649   WBC 10.05   HGB 14.6   HCT 44.8      MCV 88   RDW 13.9      K 4.0      CO2 22*   BUN 21*   CREATININE 1.67*   GLU 93   PROT 7.9   ALBUMIN 4.9   BILITOT 0.7   AST 28   ALKPHOS 74   ALT 32       Trended Cardiac Data:  Recent Labs   Lab 05/17/24  1649 05/17/24  1825   TROPONINI <0.012 <0.012       Microbiology Data:  None    Other Laboratory Data:      Other Results:  EKG (my interpretation): sinus bradycardia with frequent PACs and PVCs in a pattern of " bigeminy. Left axis deviation. LVH.     Radiology:  CXR not able to view.       Prior echo unremarkable with EF 55-60%, no diastolic dysfunction, hemodynamically significant valvular disease     Assessment:     Randall Majano is a 68 y.o. male with:  Patient Active Problem List    Diagnosis Date Noted    Syncope 05/18/2024    Acute kidney injury 05/18/2024        Plan:     Syncope   Prodromal symptoms of lightheadedness with onset of presyncopal sensation at rest in this 68 year old man with history of hypertension, prediabetes, hyperlipidemia is concerning for bradydysrhythmias including sinus pause or high grade AVN block, or a nonsustained tachydysrhythmia. May also be chronotropic incompetence in the setting of an underlying sinus bradycardia and heat exhaustion given that he was out fishing in the heat.   Repeat 12 lead ECG ordered   Received 1L IVF at [facility] ED. Will administer 1L LR here and reassess orthostatics. Appears clinically dry to euvolemic with JVP at the clavicle.  TTE ordered.   Prior carotid US 2/2024 unrevealing for atherosclerotic disease.     KDIGO Stage I Non Oliguric CATHLEEN   Baseline Cr 0.8, elevated to 1.67 here. Improved to 1.3 with IVF.   Urine studies suggestive of intrinsic CATHLEEN however were obtained after fluids were initiated.     ACC Stage II Hypertension  Home meds: Losartan 100, amlodipine 10, triamterene-HCTZ 37.5-25  Holding losartan for CATHLEEN, will start amlodipine while inpatient and add on other home antihypertensives as needed    Hyperlipidemia, goal LDL<100  ASCVD 28% 10 year risk, 8.7% with risk modification.   Starting atorvastatin 40mg daily    Pre-diabetes  On ozempic at home.     GOC: Full code  MPOA: Wife, Ernie Majano   Dispo: Obs, pending workup      Bianca Goodwin  [facility] Internal Medicine/Emergency Medicine HO-III  [facility] Internal Medicine Night Float     [facility] Medicine Hospitalist Pager numbers:   [facility] Hospitalist Medicine Team A (Cecilio/Marcelle): [phone]  [facility]  Hospitalist Medicine Team B (Joy/Maria Dolores):  [phone]

## 2024-05-19 LAB
ALBUMIN SERPL BCP-MCNC: 3.5 G/DL (ref 3.5–5.2)
ALP SERPL-CCNC: 77 U/L (ref 55–135)
ALT SERPL W/O P-5'-P-CCNC: 32 U/L (ref 10–44)
ANION GAP SERPL CALC-SCNC: 9 MMOL/L (ref 8–16)
AORTIC ROOT ANNULUS: 3.65 CM
AST SERPL-CCNC: 23 U/L (ref 10–40)
AV INDEX (PROSTH): 0.64
AV MEAN GRADIENT: 4 MMHG
AV PEAK GRADIENT: 9 MMHG
AV VALVE AREA BY VELOCITY RATIO: 2.74 CM²
AV VALVE AREA: 2.55 CM²
AV VELOCITY RATIO: 0.68
BASOPHILS # BLD AUTO: 0.03 K/UL (ref 0–0.2)
BASOPHILS NFR BLD: 0.3 % (ref 0–1.9)
BILIRUB SERPL-MCNC: 0.3 MG/DL (ref 0.1–1)
BSA FOR ECHO PROCEDURE: 2.25 M2
BUN SERPL-MCNC: 14 MG/DL (ref 8–23)
CALCIUM SERPL-MCNC: 9.2 MG/DL (ref 8.7–10.5)
CHLORIDE SERPL-SCNC: 101 MMOL/L (ref 95–110)
CO2 SERPL-SCNC: 26 MMOL/L (ref 23–29)
CREAT SERPL-MCNC: 1.1 MG/DL (ref 0.5–1.4)
CV ECHO LV RWT: 0.77 CM
DIFFERENTIAL METHOD BLD: ABNORMAL
DOP CALC AO PEAK VEL: 1.48 M/S
DOP CALC AO VTI: 28.9 CM
DOP CALC LVOT AREA: 4 CM2
DOP CALC LVOT DIAMETER: 2.26 CM
DOP CALC LVOT PEAK VEL: 1.01 M/S
DOP CALC LVOT STROKE VOLUME: 73.77 CM3
DOP CALC MV VTI: 20 CM
DOP CALCLVOT PEAK VEL VTI: 18.4 CM
E WAVE DECELERATION TIME: 277.87 MSEC
E/A RATIO: 0.44
E/E' RATIO: 3.52 M/S
ECHO LV POSTERIOR WALL: 1.55 CM (ref 0.6–1.1)
EOSINOPHIL # BLD AUTO: 0.2 K/UL (ref 0–0.5)
EOSINOPHIL NFR BLD: 2.6 % (ref 0–8)
ERYTHROCYTE [DISTWIDTH] IN BLOOD BY AUTOMATED COUNT: 13.9 % (ref 11.5–14.5)
EST. GFR  (NO RACE VARIABLE): >60 ML/MIN/1.73 M^2
FRACTIONAL SHORTENING: 43 % (ref 28–44)
GLUCOSE SERPL-MCNC: 128 MG/DL (ref 70–110)
HCT VFR BLD AUTO: 39.9 % (ref 40–54)
HGB BLD-MCNC: 13.2 G/DL (ref 14–18)
IMM GRANULOCYTES # BLD AUTO: 0.03 K/UL (ref 0–0.04)
IMM GRANULOCYTES NFR BLD AUTO: 0.3 % (ref 0–0.5)
INTERVENTRICULAR SEPTUM: 1.25 CM (ref 0.6–1.1)
IVC DIAMETER: 1.92 CM
IVRT: 83.73 MSEC
LA MAJOR: 6.14 CM
LA MINOR: 6.47 CM
LA WIDTH: 3.8 CM
LEFT ATRIUM SIZE: 3.18 CM
LEFT ATRIUM VOLUME INDEX MOD: 23.7 ML/M2
LEFT ATRIUM VOLUME INDEX: 29.8 ML/M2
LEFT ATRIUM VOLUME MOD: 51.42 CM3
LEFT ATRIUM VOLUME: 64.72 CM3
LEFT INTERNAL DIMENSION IN SYSTOLE: 2.31 CM (ref 2.1–4)
LEFT VENTRICLE DIASTOLIC VOLUME INDEX: 33.1 ML/M2
LEFT VENTRICLE DIASTOLIC VOLUME: 71.82 ML
LEFT VENTRICLE MASS INDEX: 98 G/M2
LEFT VENTRICLE SYSTOLIC VOLUME INDEX: 8.4 ML/M2
LEFT VENTRICLE SYSTOLIC VOLUME: 18.3 ML
LEFT VENTRICULAR INTERNAL DIMENSION IN DIASTOLE: 4.04 CM (ref 3.5–6)
LEFT VENTRICULAR MASS: 211.99 G
LV LATERAL E/E' RATIO: 4 M/S
LV SEPTAL E/E' RATIO: 3.14 M/S
LVOT MG: 2.5 MMHG
LVOT MV: 0.76 CM/S
LYMPHOCYTES # BLD AUTO: 4.3 K/UL (ref 1–4.8)
LYMPHOCYTES NFR BLD: 48.2 % (ref 18–48)
MAGNESIUM SERPL-MCNC: 1.8 MG/DL (ref 1.6–2.6)
MCH RBC QN AUTO: 29.1 PG (ref 27–31)
MCHC RBC AUTO-ENTMCNC: 33.1 G/DL (ref 32–36)
MCV RBC AUTO: 88 FL (ref 82–98)
MONOCYTES # BLD AUTO: 0.7 K/UL (ref 0.3–1)
MONOCYTES NFR BLD: 7.8 % (ref 4–15)
MV A" WAVE DURATION": 213.13 MSEC
MV MEAN GRADIENT: 0 MMHG
MV PEAK A VEL: 0.99 M/S
MV PEAK E VEL: 0.44 M/S
MV PEAK GRADIENT: 2 MMHG
MV STENOSIS PRESSURE HALF TIME: 69.54 MS
MV VALVE AREA BY CONTINUITY EQUATION: 3.69 CM2
MV VALVE AREA P 1/2 METHOD: 3.16 CM2
NEUTROPHILS # BLD AUTO: 3.6 K/UL (ref 1.8–7.7)
NEUTROPHILS NFR BLD: 40.8 % (ref 38–73)
NRBC BLD-RTO: 0 /100 WBC
OHS LV EJECTION FRACTION SIMPSONS BIPLANE MOD: 54 %
PHOSPHATE SERPL-MCNC: 3.9 MG/DL (ref 2.7–4.5)
PISA MRMAX VEL: 2.26 M/S
PISA TR MAX VEL: 1.83 M/S
PLATELET # BLD AUTO: 249 K/UL (ref 150–450)
PMV BLD AUTO: 10.7 FL (ref 9.2–12.9)
POTASSIUM SERPL-SCNC: 3.9 MMOL/L (ref 3.5–5.1)
PROT SERPL-MCNC: 6.7 G/DL (ref 6–8.4)
PULM VEIN S/D RATIO: 0.58
PV PEAK D VEL: 0.43 M/S
PV PEAK S VEL: 0.25 M/S
RA MAJOR: 5.06 CM
RA WIDTH: 2.96 CM
RBC # BLD AUTO: 4.54 M/UL (ref 4.6–6.2)
RV TISSUE DOPPLER FREE WALL SYSTOLIC VELOCITY 1 (APICAL 4 CHAMBER VIEW): 18.41 CM/S
SINUS: 3.75 CM
SODIUM SERPL-SCNC: 136 MMOL/L (ref 136–145)
TDI LATERAL: 0.11 M/S
TDI SEPTAL: 0.14 M/S
TDI: 0.13 M/S
TR MAX PG: 13 MMHG
TRICUSPID ANNULAR PLANE SYSTOLIC EXCURSION: 3.2 CM
WBC # BLD AUTO: 8.92 K/UL (ref 3.9–12.7)
Z-SCORE OF LEFT VENTRICULAR DIMENSION IN END DIASTOLE: -5.75
Z-SCORE OF LEFT VENTRICULAR DIMENSION IN END SYSTOLE: -5.04

## 2024-05-19 PROCEDURE — 84100 ASSAY OF PHOSPHORUS: CPT | Performed by: STUDENT IN AN ORGANIZED HEALTH CARE EDUCATION/TRAINING PROGRAM

## 2024-05-19 PROCEDURE — 25000003 PHARM REV CODE 250: Performed by: STUDENT IN AN ORGANIZED HEALTH CARE EDUCATION/TRAINING PROGRAM

## 2024-05-19 PROCEDURE — 11000001 HC ACUTE MED/SURG PRIVATE ROOM

## 2024-05-19 PROCEDURE — 80053 COMPREHEN METABOLIC PANEL: CPT | Performed by: STUDENT IN AN ORGANIZED HEALTH CARE EDUCATION/TRAINING PROGRAM

## 2024-05-19 PROCEDURE — 85025 COMPLETE CBC W/AUTO DIFF WBC: CPT | Performed by: STUDENT IN AN ORGANIZED HEALTH CARE EDUCATION/TRAINING PROGRAM

## 2024-05-19 PROCEDURE — 99233 SBSQ HOSP IP/OBS HIGH 50: CPT | Mod: ,,, | Performed by: STUDENT IN AN ORGANIZED HEALTH CARE EDUCATION/TRAINING PROGRAM

## 2024-05-19 PROCEDURE — 63600175 PHARM REV CODE 636 W HCPCS: Performed by: STUDENT IN AN ORGANIZED HEALTH CARE EDUCATION/TRAINING PROGRAM

## 2024-05-19 PROCEDURE — 36415 COLL VENOUS BLD VENIPUNCTURE: CPT | Performed by: STUDENT IN AN ORGANIZED HEALTH CARE EDUCATION/TRAINING PROGRAM

## 2024-05-19 PROCEDURE — 83735 ASSAY OF MAGNESIUM: CPT | Performed by: STUDENT IN AN ORGANIZED HEALTH CARE EDUCATION/TRAINING PROGRAM

## 2024-05-19 RX ORDER — ATORVASTATIN CALCIUM 40 MG/1
40 TABLET, FILM COATED ORAL DAILY
Qty: 90 TABLET | Refills: 3 | OUTPATIENT
Start: 2024-05-20 | End: 2025-05-20

## 2024-05-19 RX ADMIN — AMLODIPINE BESYLATE 10 MG: 5 TABLET ORAL at 09:05

## 2024-05-19 RX ADMIN — GABAPENTIN 300 MG: 300 CAPSULE ORAL at 09:05

## 2024-05-19 RX ADMIN — ENOXAPARIN SODIUM 40 MG: 40 INJECTION SUBCUTANEOUS at 05:05

## 2024-05-19 RX ADMIN — ATORVASTATIN CALCIUM 40 MG: 40 TABLET, FILM COATED ORAL at 09:05

## 2024-05-19 NOTE — PROGRESS NOTES
"[facility] Internal Medicine Resident HO-3 Progress Note    Subjective:      Patient feels well this normal. No further lightheadedness. No chest pain, shortness of breath, palpitations. Continues to have frequent PVCs.    Objective:   Last 24 Hour Vital Signs:  BP  Min: 113/56  Max: 141/67  Temp  Av °F (36.7 °C)  Min: 97.9 °F (36.6 °C)  Max: 98.2 °F (36.8 °C)  Pulse  Av.6  Min: 39  Max: 83  Resp  Av.3  Min: 16  Max: 18  SpO2  Av.8 %  Min: 92 %  Max: 98 %  Height  Av' 7" (170.2 cm)  Min: 5' 7" (170.2 cm)  Max: 5' 7" (170.2 cm)  Weight  Av kg (236 lb)  Min: 107 kg (236 lb)  Max: 107 kg (236 lb)  I/O last 3 completed shifts:  In: 1462 [P.O.:475; IV Piggyback:987]  Out: 3000 [Urine:3000]    Physical Examination:  Vitals and nursing note reviewed.  Constitutional:       General: He is not in acute distress.     Appearance: Normal appearance. He is not ill-appearing.   HENT:      Head: Atraumatic.      Nose: Nose normal. No congestion or rhinorrhea.      Mouth/Throat:      Mouth: Mucous membranes are moist.   Eyes:      Extraocular Movements: Extraocular movements intact.      Conjunctiva/sclera: Conjunctivae normal.   Cardiovascular:      Rate and Rhythm: Bradycardia (50s) present. Rhythm irregular.      Pulses: Normal pulses.   Pulmonary:      Effort: Pulmonary effort is normal. No respiratory distress.      Breath sounds: Normal breath sounds.   Abdominal:      General: Abdomen is flat.      Palpations: Abdomen is soft.   Musculoskeletal:         General: No swelling, tenderness, deformity or signs of injury. Normal range of motion.      Cervical back: Normal range of motion.      Right lower leg: No edema.      Left lower leg: No edema.   Skin:     General: Skin is warm and dry.      Coloration: Skin is not jaundiced or pale.   Neurological:      Mental Status: He is alert and oriented to person, place, and time.      Cranial Nerves: No cranial nerve deficit.      Sensory: No sensory deficit. "   Psychiatric:         Mood and Affect: Mood normal.         Behavior: Behavior normal.         Thought Content: Thought content normal.         Judgment: Judgment normal.       Laboratory:  Laboratory Data Reviewed: yes  Pertinent Findings:  Cr 1.6 ---> 1.1  Ferritin 452, iron 41, sat 12%, TIBC wnl  - B12 and folate wnl    Microbiology Data Reviewed: yes  Pertinent Findings:  None    Other Results:  EKG (my interpretation): PAC's noted, Conduction abnormality.    Radiology Data Reviewed: yes  Pertinent Findings:  No new imaging    Current Medications:     Infusions:       Scheduled:   amLODIPine  10 mg Oral Daily    atorvastatin  40 mg Oral Daily    enoxparin  40 mg Subcutaneous Daily    gabapentin  300 mg Oral QHS        PRN:    Current Facility-Administered Medications:     dextrose 10%, 12.5 g, Intravenous, PRN    dextrose 10%, 25 g, Intravenous, PRN    glucagon (human recombinant), 1 mg, Intramuscular, PRN    glucose, 16 g, Oral, PRN    glucose, 24 g, Oral, PRN    naloxone, 0.02 mg, Intravenous, PRN    ondansetron, 4 mg, Intravenous, Q6H PRN    sodium chloride 0.9%, 10 mL, Intravenous, Q12H PRN    Antibiotics and Day Number of Therapy:  none    Lines and Day Number of Therapy:  PIV x1    Assessment:     Randall Majano is a 68 y.o.male with  Patient Active Problem List    Diagnosis Date Noted    Loss of consciousness 05/18/2024    Acute kidney injury 05/18/2024    Elevated CPK 05/18/2024    Bradycardia 05/18/2024    Primary hypertension 05/18/2024    PAC (premature atrial contraction) 05/18/2024    PVC's (premature ventricular contractions) 05/18/2024        Plan:     Syncope   Prodromal symptoms of lightheadedness with onset of presyncopal sensation at rest in this 68 year old man with history of hypertension, prediabetes, hyperlipidemia is concerning for bradydysrhythmias including sinus pause or high grade AVN block, or a nonsustained tachydysrhythmia. May also be chronotropic incompetence in the setting of  an underlying sinus bradycardia and heat exhaustion given that he was out fishing in the heat.   - Repeat 12 lead ECG with significant PACs and conduction abnormality   - Received 1L IVF at [facility] ED. Administered another 1L LR at admission, orthostatics negative. Appears clinically dry to euvolemic with JVP at the clavicle.  - TTE ordered and pending  - Prior carotid US 2/2024 unrevealing for atherosclerotic disease.   - Will need EP/cardiology and PCP follow-up established at discharge  - talk to cardiology about event monitor at discharge     KDIGO Stage I Non Oliguric CATHLEEN   Baseline Cr 0.8, elevated to 1.67 here. Improved to 1.1 with IVF.   Urine studies suggestive of intrinsic CATHLEEN however were obtained after fluids were initiated.      ACC Stage II Hypertension  Home meds: Losartan 100, amlodipine 10, triamterene-HCTZ 37.5-25  Holding losartan for CATHLEEN, continue amlodipine while inpatient and add on other home antihypertensives as needed     Anemia of Chronic Inflammation  - iron studies collected, consistent with chronic inflammation  - Hgb stable ~13    Hyperlipidemia, goal LDL<100  ASCVD 28% 10 year risk, 8.7% with risk modification.   Started atorvastatin 40mg daily     Pre-diabetes  On ozempic at home.      GOC: Full code  MPOA: Wife, Ernie Majano   Dispo: Likely discharge today with event monitor    Ashlyn Palm  [facility] Internal Medicine HO-3  [facility] Hospitalist Service Team A    [facility] Medicine Hospitalist Pager numbers:   [facility] Hospitalist Medicine Team A (Cecilio/Marcelle): [phone]  [facility] Hospitalist Medicine Team B (Joy/Maria Dolores):  [phone]

## 2024-05-19 NOTE — PROGRESS NOTES
Cardiology        SUBJECTIVE:     Patient seen and examined this am, BARBRAEO. Wife at bedside. Tele: concerning for 2 AVB    Followed [facility] for bradycardia    Review of patient's allergies indicates:  No Known Allergies    Past Medical History:   Diagnosis Date    Hypertension      History reviewed. No pertinent surgical history.  No family history on file.  Social History     Tobacco Use    Smoking status: Never    Smokeless tobacco: Never   Substance Use Topics    Alcohol use: Yes    Drug use: No        Home meds:  No current facility-administered medications on file prior to encounter.     Current Outpatient Medications on File Prior to Encounter   Medication Sig Dispense Refill    amLODIPine (NORVASC) 10 MG tablet Take 10 mg by mouth once daily.      gabapentin (NEURONTIN) 300 MG capsule Take 300 mg by mouth every evening.      ibuprofen (ADVIL,MOTRIN) 800 MG tablet Take 800 mg by mouth every 8 (eight) hours as needed.      irbesartan (AVAPRO) 300 MG tablet Take 300 mg by mouth once daily.      omeprazole (PRILOSEC) 20 MG capsule Take 20 mg by mouth once daily.      triamterene-hydrochlorothiazide 37.5-25 mg (DYAZIDE) 37.5-25 mg per capsule Take 1 capsule by mouth every morning.         Current meds:  Scheduled Meds:   amLODIPine  10 mg Oral Daily    atorvastatin  40 mg Oral Daily    enoxparin  40 mg Subcutaneous Daily    gabapentin  300 mg Oral QHS     Continuous Infusions:  PRN Meds:.  Current Facility-Administered Medications:     dextrose 10%, 12.5 g, Intravenous, PRN    dextrose 10%, 25 g, Intravenous, PRN    glucagon (human recombinant), 1 mg, Intramuscular, PRN    glucose, 16 g, Oral, PRN    glucose, 24 g, Oral, PRN    naloxone, 0.02 mg, Intravenous, PRN    ondansetron, 4 mg, Intravenous, Q6H PRN    sodium chloride 0.9%, 10 mL, Intravenous, Q12H PRN      OBJECTIVE:     Vital Signs (Most Recent)  Temp: 97.4 °F (36.3 °C) (05/19/24 0819)  Pulse: 92 (05/19/24 1211)  Resp: 18 (05/19/24 0819)  BP: (!) 152/70  "(05/19/24 0819)  SpO2: 97 % (05/19/24 0819)    Vital Signs Range (Last 24H):  Temp:  [97.4 °F (36.3 °C)-98.2 °F (36.8 °C)]   Pulse:  [41-92]   Resp:  [16-18]   BP: (113-152)/(56-70)   SpO2:  [92 %-98 %]     Physical Exam:  Gen: AOx3, NAD, comfortable appearing  HEENT: NCAT, PERRL, EOMI, MMM, JVP, no thyromegaly, lymphadenopathy appreciated  CV: bradycardia S1/S2 appreciated, no murmur; no gallop or rubs  Resp: CTAB, no increased WOB  Abdomen: Soft, NT, ND, +BS  Ext: 2+ distal pulses, no edema appreciated  Skin: Warm, dry, no rashes appreciated  Psych: Good mood, Affect  Neuro: AAOx4, Strength 5/5 in extremities bilaterally; sensation to touch equal throughout, follows commands        Laboratory:  LABS  CBC  Recent Labs   Lab 05/17/24 1649 05/18/24 0242 05/19/24  0232   WBC 10.05 11.77 8.92   RBC 5.07 4.68 4.54*   HGB 14.6 13.5* 13.2*   HCT 44.8 40.4 39.9*    277 249   MCV 88 86 88   MCH 28.8 28.8 29.1   MCHC 32.6 33.4 33.1     BMP  Recent Labs   Lab 05/17/24 1649 05/18/24 0233 05/19/24  0232    137 136   K 4.0 4.2 3.9   CO2 22* 22* 26    101 101   BUN 21* 17 14   CREATININE 1.67* 1.3 1.1   GLU 93 100 128*       Recent Labs   Lab 05/17/24 1649 05/18/24 0233 05/19/24  0232   CALCIUM 9.6 9.9 9.2   MG 2.1 2.0 1.8   PHOS  --  4.4 3.9       LFT  Recent Labs   Lab 05/17/24 1649 05/18/24 0233 05/19/24  0232   PROT 7.9 7.3 6.7   ALBUMIN 4.9 3.8 3.5   BILITOT 0.7 0.5 0.3   AST 28 18 23   ALKPHOS 74 70 77   ALT 32 24 32       COAGS  No results for input(s): "PT", "INR", "APTT" in the last 168 hours.  CE  Recent Labs   Lab 05/17/24  1649 05/17/24  1825 05/18/24  0233   TROPONINI <0.012 <0.012 <0.006     BNP  No results for input(s): "BNP" in the last 168 hours.  Lipid panel:  Lab Results   Component Value Date    CHOL 144 05/18/2024     Lab Results   Component Value Date    HDL 46 05/18/2024     Lab Results   Component Value Date    LDLCALC 79.0 05/18/2024     Lab Results   Component Value Date    " TRIG 95 05/18/2024     Lab Results   Component Value Date    CHOLHDL 31.9 05/18/2024     Diagnostic Results:  EKG: NSR with PACs and PVCs  CXR: cardiomegaly      Chart review:  Echo: EF >55%. Normal systolic function.       Carotid US 02/15/24  Peak systolic velocities are as follows:     Right ICA:  68 cm per second,   Right CCA:  111 cm per second,   Right ICA/CCA ratio: 0.6,     Left ICA: 1 7 cm per second,   Left CCA:  107 cm per second,   Left ICA/CCA ratio: 1.0,   STENOSIS MEASUREMENTS ARE DERIVED FROM VELOCITY DATA USING NASCET METHODOLOGY.     IMPRESSION:   No hemodynamically significant stenosis of either carotid bifurcation. Antegrade flow vertebral arteries.     US AAA screening  FINDINGS:     Aorta:   Proximal aorta: 2.5 x 2.4 cm.   Mid aorta: 1.8 x 1.8 cm.   Distal aorta: 1.6 x 1.5 cm.   Right common iliac artery: 1.2 cm.   Left common iliac artery: 1.1 cm.     Shadowing atherosclerotic plaque.     IMPRESSION:   1.   Nonaneurysmal abdominal aorta.       Echo 05/02/22    Interpretation Summary  Ejection Fraction = 55-60%.  There is no definite diastolic dysfunction.  Focally calcified mitral eaflets.  There is trace mitral regurgitation.     ASSESSMENT/PLAN:   69 yo gentleman with pmhx of HTN and bradycardia per chart review presented with syncopal episodes.   Followed at [facility] for bradycardia by Naif Obregon 05/02/22    Syncope- Unclear etiology, ECG with significant PACs/PVCs. Tele concerning for 2 AVB will discuss case with our cardiology team to see he needs a PPM vs event monitor. Patient is aware and is agreeable to proceed PM implantation if needed.  Per patient and wife this am they have been seen at  for bradycardia/dizziness for a few years but have not follow up with cards since 2022. Now with this syncopal and previous presyncopal episode- he might benefit of PPM in the near future.      William Cope MD

## 2024-05-19 NOTE — PROGRESS NOTES
Inpatient Upgrade Note    Randall Majano has warranted treatment spanning two or more midnights of hospital level care for the management of  Syncope, Non-Oliguric CATHLEEN . He continues to require monitoring of vital signs and further evaluation by consultants. His condition is also complicated by the following comorbidities: Hypertension.

## 2024-05-20 VITALS
RESPIRATION RATE: 18 BRPM | HEIGHT: 67 IN | BODY MASS INDEX: 37.04 KG/M2 | TEMPERATURE: 97 F | DIASTOLIC BLOOD PRESSURE: 79 MMHG | SYSTOLIC BLOOD PRESSURE: 170 MMHG | WEIGHT: 236 LBS | HEART RATE: 73 BPM | OXYGEN SATURATION: 97 %

## 2024-05-20 DIAGNOSIS — R00.1 BRADYCARDIA: Primary | ICD-10-CM

## 2024-05-20 PROBLEM — R74.8 ELEVATED CPK: Status: RESOLVED | Noted: 2024-05-18 | Resolved: 2024-05-20

## 2024-05-20 PROBLEM — R40.20 LOSS OF CONSCIOUSNESS: Status: RESOLVED | Noted: 2024-05-18 | Resolved: 2024-05-20

## 2024-05-20 PROBLEM — N17.9 ACUTE KIDNEY INJURY: Status: RESOLVED | Noted: 2024-05-18 | Resolved: 2024-05-20

## 2024-05-20 LAB
ALBUMIN SERPL BCP-MCNC: 3.4 G/DL (ref 3.5–5.2)
ALP SERPL-CCNC: 83 U/L (ref 55–135)
ALT SERPL W/O P-5'-P-CCNC: 50 U/L (ref 10–44)
ANION GAP SERPL CALC-SCNC: 11 MMOL/L (ref 8–16)
AST SERPL-CCNC: 28 U/L (ref 10–40)
BASOPHILS # BLD AUTO: 0.03 K/UL (ref 0–0.2)
BASOPHILS NFR BLD: 0.3 % (ref 0–1.9)
BILIRUB SERPL-MCNC: 0.3 MG/DL (ref 0.1–1)
BUN SERPL-MCNC: 12 MG/DL (ref 8–23)
CALCIUM SERPL-MCNC: 9.1 MG/DL (ref 8.7–10.5)
CHLORIDE SERPL-SCNC: 103 MMOL/L (ref 95–110)
CO2 SERPL-SCNC: 23 MMOL/L (ref 23–29)
CREAT SERPL-MCNC: 1 MG/DL (ref 0.5–1.4)
DIFFERENTIAL METHOD BLD: ABNORMAL
EOSINOPHIL # BLD AUTO: 0.3 K/UL (ref 0–0.5)
EOSINOPHIL NFR BLD: 3.4 % (ref 0–8)
ERYTHROCYTE [DISTWIDTH] IN BLOOD BY AUTOMATED COUNT: 13.8 % (ref 11.5–14.5)
EST. GFR  (NO RACE VARIABLE): >60 ML/MIN/1.73 M^2
GLUCOSE SERPL-MCNC: 100 MG/DL (ref 70–110)
HCT VFR BLD AUTO: 42.1 % (ref 40–54)
HGB BLD-MCNC: 13.7 G/DL (ref 14–18)
IMM GRANULOCYTES # BLD AUTO: 0.03 K/UL (ref 0–0.04)
IMM GRANULOCYTES NFR BLD AUTO: 0.3 % (ref 0–0.5)
LYMPHOCYTES # BLD AUTO: 4.1 K/UL (ref 1–4.8)
LYMPHOCYTES NFR BLD: 46.6 % (ref 18–48)
MAGNESIUM SERPL-MCNC: 1.9 MG/DL (ref 1.6–2.6)
MCH RBC QN AUTO: 28.8 PG (ref 27–31)
MCHC RBC AUTO-ENTMCNC: 32.5 G/DL (ref 32–36)
MCV RBC AUTO: 88 FL (ref 82–98)
MONOCYTES # BLD AUTO: 0.7 K/UL (ref 0.3–1)
MONOCYTES NFR BLD: 7.4 % (ref 4–15)
NEUTROPHILS # BLD AUTO: 3.7 K/UL (ref 1.8–7.7)
NEUTROPHILS NFR BLD: 42 % (ref 38–73)
NRBC BLD-RTO: 0 /100 WBC
PHOSPHATE SERPL-MCNC: 4.1 MG/DL (ref 2.7–4.5)
PLATELET # BLD AUTO: 240 K/UL (ref 150–450)
PMV BLD AUTO: 10.9 FL (ref 9.2–12.9)
POTASSIUM SERPL-SCNC: 4 MMOL/L (ref 3.5–5.1)
PROT SERPL-MCNC: 6.5 G/DL (ref 6–8.4)
RBC # BLD AUTO: 4.76 M/UL (ref 4.6–6.2)
SODIUM SERPL-SCNC: 137 MMOL/L (ref 136–145)
WBC # BLD AUTO: 8.82 K/UL (ref 3.9–12.7)

## 2024-05-20 PROCEDURE — 84100 ASSAY OF PHOSPHORUS: CPT | Performed by: STUDENT IN AN ORGANIZED HEALTH CARE EDUCATION/TRAINING PROGRAM

## 2024-05-20 PROCEDURE — 80053 COMPREHEN METABOLIC PANEL: CPT | Performed by: STUDENT IN AN ORGANIZED HEALTH CARE EDUCATION/TRAINING PROGRAM

## 2024-05-20 PROCEDURE — 83735 ASSAY OF MAGNESIUM: CPT | Performed by: STUDENT IN AN ORGANIZED HEALTH CARE EDUCATION/TRAINING PROGRAM

## 2024-05-20 PROCEDURE — 85025 COMPLETE CBC W/AUTO DIFF WBC: CPT | Performed by: STUDENT IN AN ORGANIZED HEALTH CARE EDUCATION/TRAINING PROGRAM

## 2024-05-20 PROCEDURE — 25000003 PHARM REV CODE 250: Performed by: STUDENT IN AN ORGANIZED HEALTH CARE EDUCATION/TRAINING PROGRAM

## 2024-05-20 PROCEDURE — 36415 COLL VENOUS BLD VENIPUNCTURE: CPT | Performed by: STUDENT IN AN ORGANIZED HEALTH CARE EDUCATION/TRAINING PROGRAM

## 2024-05-20 PROCEDURE — 99222 1ST HOSP IP/OBS MODERATE 55: CPT | Mod: GC,,, | Performed by: INTERNAL MEDICINE

## 2024-05-20 RX ORDER — DICLOFENAC SODIUM 10 MG/G
2 GEL TOPICAL DAILY
Qty: 400 G | Refills: 6 | Status: SHIPPED | OUTPATIENT
Start: 2024-05-20

## 2024-05-20 RX ORDER — DICLOFENAC SODIUM 10 MG/G
2 GEL TOPICAL DAILY
Qty: 400 G | Refills: 6 | Status: SHIPPED | OUTPATIENT
Start: 2024-05-20 | End: 2024-05-20

## 2024-05-20 RX ORDER — LIDOCAINE 50 MG/G
1 PATCH TOPICAL DAILY
Qty: 30 PATCH | Refills: 3 | Status: SHIPPED | OUTPATIENT
Start: 2024-05-20 | End: 2024-05-20

## 2024-05-20 RX ORDER — LIDOCAINE 50 MG/G
1 PATCH TOPICAL DAILY
Qty: 30 PATCH | Refills: 3 | Status: SHIPPED | OUTPATIENT
Start: 2024-05-20

## 2024-05-20 RX ORDER — ATORVASTATIN CALCIUM 40 MG/1
40 TABLET, FILM COATED ORAL DAILY
Qty: 90 TABLET | Refills: 3 | Status: SHIPPED | OUTPATIENT
Start: 2024-05-21 | End: 2025-05-21

## 2024-05-20 RX ADMIN — AMLODIPINE BESYLATE 10 MG: 5 TABLET ORAL at 08:05

## 2024-05-20 RX ADMIN — ATORVASTATIN CALCIUM 40 MG: 40 TABLET, FILM COATED ORAL at 08:05

## 2024-05-20 NOTE — PLAN OF CARE
Discharge orders noted. AVS prepared with medication list, importance of medication compliance, follow up appointments, diet, home care instructions, treatment plan, self management, and when to seek medical attention. Detailed clinical reference list attached. AVS printed and handed to patient and wife by bedside nurse. VN reviewed discharge instructions with patient and wife using teachback method.  Allowed time for questions, all questions answered.  Patient and wife verbalized complete understanding of discharge instructions and voices no concerns.      Discharge instructions complete, waiting on meds,  Transport wheelchair not requested.  Bedside nurse notified.      Detail Level: Detailed Depth Of Biopsy: dermis Was A Bandage Applied: Yes Size Of Lesion In Cm: 0 Biopsy Type: H and E Biopsy Method: Gali cabrera Anesthesia Type: 1% lidocaine with epinephrine Anesthesia Volume In Cc: 0.3 Hemostasis: Germaine's Wound Care: Petrolatum Dressing: Band-Aid Destruction After The Procedure: No Type Of Destruction Used: Curettage Curettage Text: The wound bed was treated with curettage after the biopsy was performed. Cryotherapy Text: The wound bed was treated with cryotherapy after the biopsy was performed. Electrodesiccation Text: The wound bed was treated with electrodesiccation after the biopsy was performed. Electrodesiccation And Curettage Text: The wound bed was treated with electrodesiccation and curettage after the biopsy was performed. Silver Nitrate Text: The wound bed was treated with silver nitrate after the biopsy was performed. Lab: [id number] Lab Facility: [id number] Consent: Written consent was obtained and risks were reviewed including but not limited to scarring, infection, bleeding, scabbing, incomplete removal, nerve damage and allergy to anesthesia. Post-Care Instructions: I reviewed with the patient in detail post-care instructions. Patient is to keep the biopsy site dry overnight, and then apply vaseline once daily until healed. Patient may apply hydrogen peroxide soaks for 1-2 days to remove any crusting. Notification Instructions: Patient will be notified of biopsy results. However, patient instructed to call the office if not contacted within 2 weeks. Billing Type: Third-Party Bill Information: Selecting Yes will display possible errors in your note based on the variables you have selected. This validation is only offered as a suggestion for you. PLEASE NOTE THAT THE VALIDATION TEXT WILL BE REMOVED WHEN YOU FINALIZE YOUR NOTE. IF YOU WANT TO FAX A PRELIMINARY NOTE YOU WILL NEED TO TOGGLE THIS TO 'NO' IF YOU DO NOT WANT IT IN YOUR FAXED NOTE.

## 2024-05-20 NOTE — CARE UPDATE
Discussed case with Dr Cope and Dr Cintron.  Patient with longstanding history of bradycardia. Noted to have frequent PACs followed by non-conducted P waves then junctional escape beats. No AV block is noted. No indication for PPM at this time. 30D event monitor ordered as OP- office messaged. Patient requests to follow up with Dr Chiang- office messaged for appointment.   Patient cleared for DC from CV perspective.

## 2024-05-20 NOTE — PLAN OF CARE
SOCIAL WORK DISCHARGE PLANNING ASSESSMENT    SW completed discharge planning assessment with pt. Pt was easily engaged and education on the role of  was provided. Pt stated he lives with his wife Mrs. Majano ([phone]). Pt stated she has good support from family and friends. Pt stated he has no DME and is not current with  services. Pt drives himself to doctor appointments and Mrs. Majano will provide transportation home following discharge. Pt was encouraged to call with any questions or concerns. Pt verbalized understanding.     SW requested PCP and cards follow up appointment.     Patient Active Problem List   Diagnosis    Loss of consciousness    Acute kidney injury    Elevated CPK    Bradycardia    Primary hypertension    PAC (premature atrial contraction)    PVC's (premature ventricular contractions)      05/20/24 1001   Discharge Assessment   Assessment Type Discharge Planning Assessment   Confirmed/corrected address, phone number and insurance Yes   Confirmed Demographics Correct on Facesheet   Source of Information patient   Communicated LYNSEY with patient/caregiver Date not available/Unable to determine   People in Home spouse   Facility Arrived From: home   Do you expect to return to your current living situation? Yes   Do you have help at home or someone to help you manage your care at home? Yes   Who are your caregiver(s) and their phone number(s)? pt's wife Mrs Majano [phone]   Prior to hospitilization cognitive status: Alert/Oriented   Current cognitive status: Alert/Oriented   Walking or Climbing Stairs Difficulty no   Dressing/Bathing Difficulty no   Equipment Currently Used at Home none   Readmission within 30 days? No   Patient currently being followed by outpatient case management? No   Do you currently have service(s) that help you manage your care at home? No   Do you take prescription medications? Yes   Do you have prescription coverage? Yes   Coverage BCBS Managed  Medicare   Do you have any problems affording any of your prescribed medications? No   Is the patient taking medications as prescribed? yes   Who is going to help you get home at discharge? pt's wife Mrs. Majano [phone]   How do you get to doctors appointments? car, drives self   Are you on dialysis? No   Discharge Plan A Home with family   DME Needed Upon Discharge    (TBD)   Discharge Plan discussed with: Patient   Transition of Care Barriers None   OTHER   Name(s) of People in Home pt's wife Mrs. Majano [phone]

## 2024-05-20 NOTE — PROGRESS NOTES
Evaluation for pacemaker    Contacted by cardiology team regarding pacemaker evaluation due to syncope with some abnormalities noted on telemetry and EKG. Mr. Pereira reports a syncope episode with a prodrome while fishing in the middle of the day. He was drinking beer and no water and reports he was dehydrated. He was also recently (~3 months ago) started on a third blood pressure medication as well.    On review of EKG and telemetry noted to have frequent PACs followed by non-conducted P waves then junctional escape beats. No AV block is noted. At baseline Mr. Pereira is able to mow his lawn and is very active throughout the day. He has no prior syncope or pre-syncopal episodes and generally feels very well.     Recommend outpatient cardiac monitoring and titration of blood pressure medications given concern for polypharmacy. No indication for pacemaker at this time. Please reach out with any questions or concerns.    Atul Cai MD  [facility] [place] - Cardiovascular Fellow   05/20/2024      Reviewed the patients history and his EKGs his pauses are due to nonconducted Pac's but with occasional junctional beats. HE has no previous history of syncope and this episode was clearly related to the heat and dehydration (out in the heat and drinking only beer).   Agree with above plans

## 2024-05-20 NOTE — PLAN OF CARE
D/c orders noted, no DME, no HH.     Pt's wife will provide transportation home following discharge.     SW requested PCP and cards follow up appointment. SW added phone numbers to pt's AVS to call if he has not heard when they are scheduled by a certain date.     Pt is cleared to go from CM standpoint.       Apollo Conde MD PCP - General Internal Medicine [phone] [address] [facility] [address]      Next Steps: Follow up on 5/27/2024  Instructions: Time: 2:00p.m.        05/20/24 1143   Final Note   Assessment Type Final Discharge Note   Anticipated Discharge Disposition Home   What phone number can be called within the next 1-3 days to see how you are doing after discharge? [phone]   Post-Acute Status   Coverage BCBS Managed Medicare   Discharge Delays None known at this time

## 2024-05-20 NOTE — PROGRESS NOTES
U Internal Medicine Resident HO-3 Progress Note    Subjective:      This morning, patient states he slept well. Has been getting out of bed without any dizziness, lightheadedness, weakness. Feels back to his baseline. Denies any chest pain, shortness of breath, palpitations, any other symptoms.    Objective:   Last 24 Hour Vital Signs:  BP  Min: 123/68  Max: 153/70  Temp  Av.1 °F (36.7 °C)  Min: 98 °F (36.7 °C)  Max: 98.2 °F (36.8 °C)  Pulse  Av.6  Min: 40  Max: 92  Resp  Av.4  Min: 16  Max: 20  SpO2  Av.8 %  Min: 93 %  Max: 97 %  I/O last 3 completed shifts:  In: 415 [P.O.:415]  Out: 1600 [Urine:1600]    Physical Examination:  Vitals and nursing note reviewed.  Constitutional:       General: He is not in acute distress.     Appearance: Normal appearance. He is not ill-appearing.   HENT:      Head: Atraumatic.      Nose: Nose normal. No congestion or rhinorrhea.      Mouth/Throat:      Mouth: Mucous membranes are moist.   Eyes:      Extraocular Movements: Extraocular movements intact.      Conjunctiva/sclera: Conjunctivae normal.   Cardiovascular:      Rate and Rhythm: Bradycardia (50s) present. Rhythm irregular.      Pulses: Normal pulses.   Pulmonary:      Effort: Pulmonary effort is normal. No respiratory distress.      Breath sounds: Normal breath sounds.   Abdominal:      General: Abdomen is flat.      Palpations: Abdomen is soft.   Musculoskeletal:         General: No swelling, tenderness, deformity or signs of injury. Normal range of motion.      Cervical back: Normal range of motion.      Right lower leg: No edema.      Left lower leg: No edema.   Skin:     General: Skin is warm and dry.      Coloration: Skin is not jaundiced or pale.   Neurological:      Mental Status: He is alert and oriented to person, place, and time.      Cranial Nerves: No cranial nerve deficit.      Sensory: No sensory deficit.   Psychiatric:         Mood and Affect: Mood normal.         Behavior: Behavior  normal.         Thought Content: Thought content normal.         Judgment: Judgment normal.       Laboratory:  Laboratory Data Reviewed: yes  Pertinent Findings:  Cr 1.6 ---> 1.1  Ferritin 452, iron 41, sat 12%, TIBC wnl  - B12 and folate wnl    Microbiology Data Reviewed: yes  Pertinent Findings:  None    Other Results:  EKG (my interpretation): PAC's noted, Conduction abnormality.    Radiology Data Reviewed: yes  Pertinent Findings:  No new imaging    Current Medications:     Infusions:       Scheduled:   amLODIPine  10 mg Oral Daily    atorvastatin  40 mg Oral Daily    enoxparin  40 mg Subcutaneous Daily    gabapentin  300 mg Oral QHS        PRN:    Current Facility-Administered Medications:     dextrose 10%, 12.5 g, Intravenous, PRN    dextrose 10%, 25 g, Intravenous, PRN    glucagon (human recombinant), 1 mg, Intramuscular, PRN    glucose, 16 g, Oral, PRN    glucose, 24 g, Oral, PRN    naloxone, 0.02 mg, Intravenous, PRN    ondansetron, 4 mg, Intravenous, Q6H PRN    sodium chloride 0.9%, 10 mL, Intravenous, Q12H PRN    Antibiotics and Day Number of Therapy:  none    Lines and Day Number of Therapy:  PIV x1    Assessment:     Randall Majano is a 68 y.o.male with  Patient Active Problem List    Diagnosis Date Noted    Loss of consciousness 05/18/2024    Acute kidney injury 05/18/2024    Elevated CPK 05/18/2024    Bradycardia 05/18/2024    Primary hypertension 05/18/2024    PAC (premature atrial contraction) 05/18/2024    PVC's (premature ventricular contractions) 05/18/2024        Plan:     Syncope   Prodromal symptoms of lightheadedness with onset of presyncopal sensation at rest in this 68 year old man with history of hypertension, prediabetes, hyperlipidemia is concerning for bradydysrhythmias including sinus pause or high grade AVN block, or a nonsustained tachydysrhythmia. May also be chronotropic incompetence in the setting of an underlying sinus bradycardia and heat exhaustion given that he was out fishing  in the heat.   - Repeat 12 lead ECG with significant PACs and conduction abnormality   - Received 1L IVF at [facility] ED. Administered another 1L LR at admission, orthostatics negative. Appears clinically dry to euvolemic with JVP at the clavicle.  - TTE: EF 54%, indeterminate diastolic function  - Prior carotid US 2/2024 unrevealing for atherosclerotic disease.   - Will need EP/cardiology and PCP follow-up established at discharge  - Per cardiology, initially some concern for 2nd degree AV block, upon further review found to be PACs followed by non conducted P waves with junctional escape beats, no evidence of AV block.  - Outpatient cardiac monitoring, follow up with cardiology     KDIGO Stage I Non Oliguric CATHLEEN   Baseline Cr 0.8, elevated to 1.67 here. Improved to 1.1 with IVF.   - Patient taking over-the-counter supplements containing NSAIDs, counseled patient to stop taking these entirely  Urine studies suggestive of intrinsic CATHLEEN however were obtained after fluids were initiated.   - Cr improved back to baseline  - Avoid NSAIDs     ACC Stage II Hypertension  Home meds: Losartan 100, amlodipine 10, triamterene-HCTZ 37.5-25  Holding losartan for CATHLEEN, continue amlodipine while inpatient and add on other home antihypertensives as needed     Anemia of Chronic Inflammation  - iron studies collected, consistent with chronic inflammation  - Hgb stable ~13    Hyperlipidemia, goal LDL<100  ASCVD 28% 10 year risk, 8.7% with risk modification.   Started atorvastatin 40mg daily     Pre-diabetes  On ozempic at home.      GOC: Full code  MPOA: Wife, Ernie Majano   Dispo: Likely discharge today with event monitor    Almas Morales  U Internal Medicine HO-II  [facility] Hospitalist Service Team A    [facility] Medicine Hospitalist Pager numbers:   [facility] Hospitalist Medicine Team A (Cecilio/Marcelle): [phone]  [facility] Hospitalist Medicine Team B (Joy/Maria Dolores):  [phone]

## 2024-05-21 LAB
OHS QRS DURATION: 100 MS
OHS QRS DURATION: 110 MS
OHS QRS DURATION: 98 MS
OHS QTC CALCULATION: 392 MS
OHS QTC CALCULATION: 448 MS
OHS QTC CALCULATION: 453 MS

## 2024-05-21 NOTE — DISCHARGE SUMMARY
[facility] Medicine Discharge Summary    Primary Team: \[facility]\"" Hospitalist Team A  Attending Physician: Darius  Resident: Dr. Morales  Intern: Dr. Chappell    Date of Admit: 5/17/2024  Date of Discharge: 5/20/2024    Discharge to: Home/Self-Care  Condition: Stable    Discharge Diagnoses     Patient Active Problem List   Diagnosis    Bradycardia    Primary hypertension    PAC (premature atrial contraction)    PVC's (premature ventricular contractions)       Consultants and Procedures     Consultants:  Cardiology    Procedures:   None     Brief History of Present Illness      Patient is a 68 y.o. male presents with with pmhx of HTN presented to ED due to syncopal episode. He reportedly was sitting on a cooler when he felt weak stood up felt dizzy and then lost consciousness. He has a longstanding history of bradycardia. He was placed on telemetry while in the hospital and cardiology consulted for further evaluation. He was noted to have frequent PACs followed by non-conducted P waves then junctional escape beats. No AV block is noted. Per cardiology No indication for PPM at this time. A cardiac event monitor was ordered for evaluation. Patient requests to follow up with Dr. Haque.    For the full HPI please refer to the History & Physical from this admission.    Hospital Course By Problem with Pertinent Findings     Syncope  Prodromal symptoms of lightheadedness with onset of presyncopal sensation at rest in this 68 year old man with history of hypertension, prediabetes, hyperlipidemia is concerning for bradydysrhythmias including sinus pause or high grade AVN block, or a nonsustained tachydysrhythmia. May also be chronotropic incompetence in the setting of an underlying sinus bradycardia and heat exhaustion given that he was out fishing in the heat.   - Repeat 12 lead ECG with significant PACs and conduction abnormality   - Received 1L IVF at [facility] ED. Administered another 1L LR at admission, orthostatics  "negative. Appears clinically dry to euvolemic with JVP at the clavicle.  - TTE: EF 54%, indeterminate diastolic function  - Prior carotid US 2/2024 unrevealing for atherosclerotic disease.   - Will need EP/cardiology and PCP follow-up established at discharge  - Per cardiology, initially some concern for 2nd degree AV block, upon further review found to be PACs followed by non conducted P waves with junctional escape beats, no evidence of AV block.  - Outpatient cardiac monitoring, follow up with cardiology     KDIGO Stage I Non Oliguric CATHLEEN, improved  Baseline Cr 0.8, elevated to 1.67 here. Improved to 1.1 with IVF.   - Patient taking over-the-counter supplements containing NSAIDs, counseled patient to stop taking these entirely  Urine studies suggestive of intrinsic CATHLEEN however were obtained after fluids were initiated.   - Cr improved back to baseline  - Avoid NSAIDs     ACC Stage II Hypertension  Home meds: Losartan 100, amlodipine 10, triamterene-HCTZ 37.5-25  Holding losartan for CATHLEEN, continue amlodipine while inpatient and add on other home antihypertensives as needed     Anemia of Chronic Inflammation  - iron studies collected, consistent with chronic inflammation  - Hgb stable ~13     Hyperlipidemia, goal LDL<100  ASCVD 28% 10 year risk, 8.7% with risk modification.   Started atorvastatin 40mg daily     Pre-diabetes  On ozempic at home, continue on discharge          Discharge vital signs     BP (!) 170/79 (BP Location: Right arm, Patient Position: Sitting)   Pulse 73   Temp 97 °F (36.1 °C) (Oral)   Resp 18   Ht 5' 7" (1.702 m)   Wt 107 kg (236 lb)   SpO2 97%   BMI 36.96 kg/m²      Discharge Medications        Medication List        START taking these medications      atorvastatin 40 MG tablet  Commonly known as: LIPITOR  Take 1 tablet (40 mg total) by mouth once daily.     diclofenac sodium 1 % Gel  Commonly known as: VOLTAREN  Apply 2 g topically once daily. Apply to affected area/joint daily for " pain relief     LIDOcaine 5 %  Commonly known as: LIDODERM  Place 1 patch onto the skin once daily. Remove & Discard patch within 12 hours or as directed by MD            CONTINUE taking these medications      amLODIPine 10 MG tablet  Commonly known as: NORVASC     gabapentin 300 MG capsule  Commonly known as: NEURONTIN     irbesartan 300 MG tablet  Commonly known as: AVAPRO     omeprazole 20 MG capsule  Commonly known as: PRILOSEC            STOP taking these medications      ibuprofen 800 MG tablet  Commonly known as: ADVIL,MOTRIN     triamterene-hydrochlorothiazide 37.5-25 mg 37.5-25 mg per capsule  Commonly known as: DYAZIDE               Where to Get Your Medications        These medications were sent to [facility]  [address], [place] [address]      Hours: Mon-Fri, 8a-5:30p Phone: [phone]   atorvastatin 40 MG tablet  diclofenac sodium 1 % Gel  LIDOcaine 5 %          Discharge Information:   Diet:  Cardiac    Physical Activity:  As tolerated             Instructions:  1. Take all medications as prescribed  2. Keep all follow-up appointments  3. Return to the hospital or call your primary care physicians if any worsening symptoms such as fever, chest pain, shortness of breath, return of symptoms, or any other concerns.    Follow-Up Appointments:  Please follow up with PCP and cardiology.       Christi Cote MD  [facility] Internal Medicine HO-I  05/21/2024 1:26 PM

## 2024-05-25 NOTE — PROGRESS NOTES
"[facility] Cardiology 701     SUBJECTIVE:     History of Present Illness:  Patient is a 68 y.o. male presents for further evaluation of syncope 24 felt to be due to dehydration. .     Primary Diagnosis:   Hypertension: positive  DM: none  Smoker: none  Family history of early CAD  Heart disease: nonconducted PAC's and junctional beats    ROS  Since 24:  No weakness; no dizziness, no syncope  Cut grass few days ago with no issues  No chest pains  No shortness of breath  Blood pressures OK at home - definitely not low   Review of patient's allergies indicates:  No Known Allergies    Past Medical History:   Diagnosis Date    Hypertension        No past surgical history on file.        Past Hospitalization:         Cardiac meds:    Amlodipine 10 mg  Atorvastatin 40 mg  Irbesartan 300 mg       OBJECTIVE:     Vital Signs (Most Recent)  Vitals:    24 0843   BP: 113/60   Pulse: (!) 35   SpO2: 98%   Weight: 109.5 kg (241 lb 8.2 oz)   Height: 5' 7" (1.702 m)         Physical Exam:  Neck: normal carotids, no bruits; normal JVP  Lungs :clear  Heart: RR, normal S1,S2, no murmurs, no gallops  Abd: no masses; no bruits;   Exts: normal DP and PT pulses bilaterally, normal radials; no edema noted               Labs      Diagnostic Results:    1.EK/24: sinus; PVC's;   1b. EK24: sinus with PAC with nonconduction and aberrant  conduction   2.Echo: : normal   3. Stress test  4. cath    Chart review:        ASSESSMENT/PLAN:     Pauses noted due to nonconducted PAC's. No further syncope  Hypertension: controlled    Plan: 1. Cardiac event monitor to be placed this week  2. Monitor blood pressures  3. Hydrate  4. Return 2 months     Ayesha Haque MD  "

## 2024-05-29 ENCOUNTER — OFFICE VISIT (OUTPATIENT)
Dept: CARDIOLOGY | Facility: CLINIC | Age: 69
End: 2024-05-29
Payer: MEDICARE

## 2024-05-29 VITALS
DIASTOLIC BLOOD PRESSURE: 60 MMHG | SYSTOLIC BLOOD PRESSURE: 113 MMHG | HEIGHT: 67 IN | WEIGHT: 241.5 LBS | BODY MASS INDEX: 37.9 KG/M2 | HEART RATE: 35 BPM | OXYGEN SATURATION: 98 %

## 2024-05-29 DIAGNOSIS — I49.3 PVC'S (PREMATURE VENTRICULAR CONTRACTIONS): ICD-10-CM

## 2024-05-29 DIAGNOSIS — R55 VASOVAGAL SYNCOPE: ICD-10-CM

## 2024-05-29 DIAGNOSIS — I49.1 PAC (PREMATURE ATRIAL CONTRACTION): ICD-10-CM

## 2024-05-29 DIAGNOSIS — I49.1 PREMATURE ATRIAL BEAT: Primary | ICD-10-CM

## 2024-05-29 PROCEDURE — 3044F HG A1C LEVEL LT 7.0%: CPT | Mod: CPTII,S$GLB,, | Performed by: INTERNAL MEDICINE

## 2024-05-29 PROCEDURE — 4010F ACE/ARB THERAPY RXD/TAKEN: CPT | Mod: CPTII,S$GLB,, | Performed by: INTERNAL MEDICINE

## 2024-05-29 PROCEDURE — 3008F BODY MASS INDEX DOCD: CPT | Mod: CPTII,S$GLB,, | Performed by: INTERNAL MEDICINE

## 2024-05-29 PROCEDURE — 99999 PR PBB SHADOW E&M-EST. PATIENT-LVL III: CPT | Mod: PBBFAC,,, | Performed by: INTERNAL MEDICINE

## 2024-05-29 PROCEDURE — 1111F DSCHRG MED/CURRENT MED MERGE: CPT | Mod: CPTII,S$GLB,, | Performed by: INTERNAL MEDICINE

## 2024-05-29 PROCEDURE — 3074F SYST BP LT 130 MM HG: CPT | Mod: CPTII,S$GLB,, | Performed by: INTERNAL MEDICINE

## 2024-05-29 PROCEDURE — 1126F AMNT PAIN NOTED NONE PRSNT: CPT | Mod: CPTII,S$GLB,, | Performed by: INTERNAL MEDICINE

## 2024-05-29 PROCEDURE — 99214 OFFICE O/P EST MOD 30 MIN: CPT | Mod: S$GLB,,, | Performed by: INTERNAL MEDICINE

## 2024-05-29 PROCEDURE — 3078F DIAST BP <80 MM HG: CPT | Mod: CPTII,S$GLB,, | Performed by: INTERNAL MEDICINE

## 2024-05-29 PROCEDURE — 3288F FALL RISK ASSESSMENT DOCD: CPT | Mod: CPTII,S$GLB,, | Performed by: INTERNAL MEDICINE

## 2024-05-29 PROCEDURE — 1160F RVW MEDS BY RX/DR IN RCRD: CPT | Mod: CPTII,S$GLB,, | Performed by: INTERNAL MEDICINE

## 2024-05-29 PROCEDURE — 1159F MED LIST DOCD IN RCRD: CPT | Mod: CPTII,S$GLB,, | Performed by: INTERNAL MEDICINE

## 2024-05-29 PROCEDURE — 1101F PT FALLS ASSESS-DOCD LE1/YR: CPT | Mod: CPTII,S$GLB,, | Performed by: INTERNAL MEDICINE

## 2024-06-06 ENCOUNTER — CLINICAL SUPPORT (OUTPATIENT)
Dept: CARDIOLOGY | Facility: HOSPITAL | Age: 69
End: 2024-06-06
Attending: NURSE PRACTITIONER
Payer: MEDICARE

## 2024-06-06 DIAGNOSIS — R00.1 BRADYCARDIA: ICD-10-CM

## 2024-08-06 ENCOUNTER — OFFICE VISIT (OUTPATIENT)
Dept: CARDIOLOGY | Facility: CLINIC | Age: 69
End: 2024-08-06
Payer: MEDICARE

## 2024-08-06 VITALS
HEIGHT: 67 IN | WEIGHT: 243.63 LBS | OXYGEN SATURATION: 96 % | BODY MASS INDEX: 38.24 KG/M2 | DIASTOLIC BLOOD PRESSURE: 72 MMHG | HEART RATE: 58 BPM | SYSTOLIC BLOOD PRESSURE: 131 MMHG

## 2024-08-06 DIAGNOSIS — R00.1 BRADYCARDIA: Primary | ICD-10-CM

## 2024-08-06 DIAGNOSIS — I49.1 PAC (PREMATURE ATRIAL CONTRACTION): ICD-10-CM

## 2024-08-06 DIAGNOSIS — T67.1XXA HEAT SYNCOPE, INITIAL ENCOUNTER: ICD-10-CM

## 2024-08-06 PROCEDURE — 3288F FALL RISK ASSESSMENT DOCD: CPT | Mod: CPTII,S$GLB,, | Performed by: INTERNAL MEDICINE

## 2024-08-06 PROCEDURE — 3075F SYST BP GE 130 - 139MM HG: CPT | Mod: CPTII,S$GLB,, | Performed by: INTERNAL MEDICINE

## 2024-08-06 PROCEDURE — 1101F PT FALLS ASSESS-DOCD LE1/YR: CPT | Mod: CPTII,S$GLB,, | Performed by: INTERNAL MEDICINE

## 2024-08-06 PROCEDURE — 99999 PR PBB SHADOW E&M-EST. PATIENT-LVL III: CPT | Mod: PBBFAC,,, | Performed by: INTERNAL MEDICINE

## 2024-08-06 PROCEDURE — 3078F DIAST BP <80 MM HG: CPT | Mod: CPTII,S$GLB,, | Performed by: INTERNAL MEDICINE

## 2024-08-06 PROCEDURE — 1126F AMNT PAIN NOTED NONE PRSNT: CPT | Mod: CPTII,S$GLB,, | Performed by: INTERNAL MEDICINE

## 2024-08-06 PROCEDURE — 3044F HG A1C LEVEL LT 7.0%: CPT | Mod: CPTII,S$GLB,, | Performed by: INTERNAL MEDICINE

## 2024-08-06 PROCEDURE — 99213 OFFICE O/P EST LOW 20 MIN: CPT | Mod: S$GLB,,, | Performed by: INTERNAL MEDICINE

## 2024-08-06 PROCEDURE — 1159F MED LIST DOCD IN RCRD: CPT | Mod: CPTII,S$GLB,, | Performed by: INTERNAL MEDICINE

## 2024-08-06 PROCEDURE — 3008F BODY MASS INDEX DOCD: CPT | Mod: CPTII,S$GLB,, | Performed by: INTERNAL MEDICINE

## 2024-08-06 PROCEDURE — 4010F ACE/ARB THERAPY RXD/TAKEN: CPT | Mod: CPTII,S$GLB,, | Performed by: INTERNAL MEDICINE

## 2024-08-06 PROCEDURE — 1160F RVW MEDS BY RX/DR IN RCRD: CPT | Mod: CPTII,S$GLB,, | Performed by: INTERNAL MEDICINE

## 2025-04-04 ENCOUNTER — TELEPHONE (OUTPATIENT)
Dept: CARDIOLOGY | Facility: CLINIC | Age: 70
End: 2025-04-04
Payer: MEDICARE

## 2025-04-04 NOTE — TELEPHONE ENCOUNTER
----- Message from Allison sent at 4/4/2025 11:59 AM CDT -----  Type:  Needs Medical AdviceWho Called: Chidi darling pts wife Would the patient rather a call back or a response via [facility]? Call back Best Call Back Number: [phone] Information: patients wife called to notify the providers office the patient is scheduled for a hip replacement procedure for 05/13. Pts wife states she would like to make sure the providers office I aware as she was advised by the [facility] office. Wife states she would like to speak with the providers office to get some information on having a medical clearance for this procedure. Please call back with further assistance.

## 2025-04-06 NOTE — PROGRESS NOTES
"[facility] Cardiology 701     SUBJECTIVE:     History of Present Illness:  Patient is a 69 y.o. male presents for further evaluation of syncope 24 felt to be due to dehydration. Needs clearance for hipsurgery May 13th     Primary Diagnosis:   Hypertension: positive  DM: none  Smoker: none  Family history of early CAD  Heart disease: nonconducted PAC's and junctional beats    ROS  Since last visist :feels great   No weakness; no dizziness, no syncope  Cut grass few days ago with no issues  No chest pains  No shortness of breath  Blood pressures OK at home - definitely not low   Review of patient's allergies indicates:  No Known Allergies    Past Medical History:   Diagnosis Date    Hypertension        History reviewed. No pertinent surgical history.        Past Hospitalization:         Cardiac meds:    Amlodipine 10 mg  Atorvastatin 40 mg  Irbesartan 300 mg       OBJECTIVE:     Vital Signs (Most Recent)  Vitals:    25 1447   BP: 137/68   Pulse: 73   SpO2: 95%   Weight: 116.7 kg (257 lb 6.2 oz)   Height: 5' 7" (1.702 m)           Physical Exam:  Neck: normal carotids, no bruits; normal JVP  Lungs :clear  Heart: RR, normal S1,S2, no murmurs, no gallops  Abd: no masses; no bruits;   Exts: normal DP and PT pulses bilaterally, normal radials; no edema noted               Labs  : A1c 5.8; LDL 55; CMP normal     Diagnostic Results:    1.EK/24: sinus; PVC's;   1b. EK24: sinus with PAC with nonconduction and aberrant  conduction   2.Echo: : normal   3. Stress test  4. Cath  5. Event monitor: sinus; nonconducted PAC; heart rates from 32 to 104     Chart review:        ASSESSMENT/PLAN:     Pauses noted due to nonconducted PAC's. No further syncope- 30 day event monitor shows the same with nonconducted PAC's;   Hypertension: controlled  3. Obesity  4. Borderline DM   5. Over 5 METS of activity   Plan: 1. Continue the same regimen  2. Weight reduction   3. Low risk for surgery  4. Return 6 months    "     Ayesha Haque MD

## 2025-04-08 ENCOUNTER — OFFICE VISIT (OUTPATIENT)
Dept: CARDIOLOGY | Facility: CLINIC | Age: 70
End: 2025-04-08
Payer: MEDICARE

## 2025-04-08 VITALS
DIASTOLIC BLOOD PRESSURE: 68 MMHG | HEART RATE: 73 BPM | WEIGHT: 257.38 LBS | OXYGEN SATURATION: 95 % | BODY MASS INDEX: 40.4 KG/M2 | HEIGHT: 67 IN | SYSTOLIC BLOOD PRESSURE: 137 MMHG

## 2025-04-08 DIAGNOSIS — I49.1 PAC (PREMATURE ATRIAL CONTRACTION): ICD-10-CM

## 2025-04-08 DIAGNOSIS — R00.1 BRADYCARDIA: Primary | ICD-10-CM

## 2025-04-08 DIAGNOSIS — R55 VASOVAGAL SYNCOPE: ICD-10-CM

## 2025-04-08 PROCEDURE — 3044F HG A1C LEVEL LT 7.0%: CPT | Mod: CPTII,S$GLB,, | Performed by: INTERNAL MEDICINE

## 2025-04-08 PROCEDURE — 1101F PT FALLS ASSESS-DOCD LE1/YR: CPT | Mod: CPTII,S$GLB,, | Performed by: INTERNAL MEDICINE

## 2025-04-08 PROCEDURE — 3078F DIAST BP <80 MM HG: CPT | Mod: CPTII,S$GLB,, | Performed by: INTERNAL MEDICINE

## 2025-04-08 PROCEDURE — 3075F SYST BP GE 130 - 139MM HG: CPT | Mod: CPTII,S$GLB,, | Performed by: INTERNAL MEDICINE

## 2025-04-08 PROCEDURE — 99999 PR PBB SHADOW E&M-EST. PATIENT-LVL III: CPT | Mod: PBBFAC,,, | Performed by: INTERNAL MEDICINE

## 2025-04-08 PROCEDURE — 3288F FALL RISK ASSESSMENT DOCD: CPT | Mod: CPTII,S$GLB,, | Performed by: INTERNAL MEDICINE

## 2025-04-08 PROCEDURE — 1159F MED LIST DOCD IN RCRD: CPT | Mod: CPTII,S$GLB,, | Performed by: INTERNAL MEDICINE

## 2025-04-08 PROCEDURE — 99214 OFFICE O/P EST MOD 30 MIN: CPT | Mod: S$GLB,,, | Performed by: INTERNAL MEDICINE

## 2025-04-08 PROCEDURE — 1126F AMNT PAIN NOTED NONE PRSNT: CPT | Mod: CPTII,S$GLB,, | Performed by: INTERNAL MEDICINE

## 2025-04-08 PROCEDURE — 1160F RVW MEDS BY RX/DR IN RCRD: CPT | Mod: CPTII,S$GLB,, | Performed by: INTERNAL MEDICINE

## 2025-04-08 PROCEDURE — 3008F BODY MASS INDEX DOCD: CPT | Mod: CPTII,S$GLB,, | Performed by: INTERNAL MEDICINE

## 2025-05-09 ENCOUNTER — TELEPHONE (OUTPATIENT)
Dept: CARDIOLOGY | Facility: CLINIC | Age: 70
End: 2025-05-09
Payer: MEDICARE

## 2025-05-09 NOTE — TELEPHONE ENCOUNTER
----- Message from Marva sent at 5/9/2025 11:29 AM CDT -----  Type:  Needs Medical AdviceWho Called: [facility] Call Back Number: [phone]   fax [phone] Information: Patient is requesting a call back in regards to clearance was received but there is a note that says see 4/25 clinic notes, she states clinic notes were not received.

## 2025-06-12 RX ORDER — LIDOCAINE 50 MG/G
1 PATCH TOPICAL DAILY
Qty: 30 PATCH | Refills: 3 | Status: CANCELLED | OUTPATIENT
Start: 2025-06-12

## 2025-06-24 RX ORDER — LIDOCAINE 50 MG/G
1 PATCH TOPICAL DAILY
Qty: 30 PATCH | Refills: 3 | Status: CANCELLED | OUTPATIENT
Start: 2025-06-12